# Patient Record
Sex: FEMALE | Race: WHITE | NOT HISPANIC OR LATINO
[De-identification: names, ages, dates, MRNs, and addresses within clinical notes are randomized per-mention and may not be internally consistent; named-entity substitution may affect disease eponyms.]

---

## 2017-01-20 ENCOUNTER — APPOINTMENT (OUTPATIENT)
Dept: PAIN MANAGEMENT | Facility: CLINIC | Age: 47
End: 2017-01-20

## 2017-01-20 VITALS
SYSTOLIC BLOOD PRESSURE: 156 MMHG | HEIGHT: 64 IN | HEART RATE: 77 BPM | WEIGHT: 234 LBS | DIASTOLIC BLOOD PRESSURE: 87 MMHG | BODY MASS INDEX: 39.95 KG/M2

## 2017-03-08 ENCOUNTER — MEDICATION RENEWAL (OUTPATIENT)
Age: 47
End: 2017-03-08

## 2017-03-13 ENCOUNTER — MEDICATION RENEWAL (OUTPATIENT)
Age: 47
End: 2017-03-13

## 2017-03-15 ENCOUNTER — APPOINTMENT (OUTPATIENT)
Dept: PAIN MANAGEMENT | Facility: CLINIC | Age: 47
End: 2017-03-15

## 2017-03-30 ENCOUNTER — APPOINTMENT (OUTPATIENT)
Dept: PAIN MANAGEMENT | Facility: CLINIC | Age: 47
End: 2017-03-30

## 2017-03-30 VITALS
BODY MASS INDEX: 40.97 KG/M2 | WEIGHT: 240 LBS | HEIGHT: 64 IN | DIASTOLIC BLOOD PRESSURE: 81 MMHG | SYSTOLIC BLOOD PRESSURE: 161 MMHG | HEART RATE: 81 BPM

## 2017-04-17 ENCOUNTER — APPOINTMENT (OUTPATIENT)
Dept: ORTHOPEDIC SURGERY | Facility: CLINIC | Age: 47
End: 2017-04-17

## 2017-05-15 ENCOUNTER — MEDICATION RENEWAL (OUTPATIENT)
Age: 47
End: 2017-05-15

## 2017-05-22 ENCOUNTER — APPOINTMENT (OUTPATIENT)
Dept: ORTHOPEDIC SURGERY | Facility: CLINIC | Age: 47
End: 2017-05-22

## 2017-05-26 ENCOUNTER — APPOINTMENT (OUTPATIENT)
Dept: PAIN MANAGEMENT | Facility: CLINIC | Age: 47
End: 2017-05-26

## 2017-06-13 ENCOUNTER — MEDICATION RENEWAL (OUTPATIENT)
Age: 47
End: 2017-06-13

## 2017-06-20 ENCOUNTER — APPOINTMENT (OUTPATIENT)
Dept: PAIN MANAGEMENT | Facility: CLINIC | Age: 47
End: 2017-06-20

## 2017-06-27 ENCOUNTER — APPOINTMENT (OUTPATIENT)
Dept: PAIN MANAGEMENT | Facility: CLINIC | Age: 47
End: 2017-06-27

## 2017-06-27 VITALS
SYSTOLIC BLOOD PRESSURE: 132 MMHG | BODY MASS INDEX: 40.97 KG/M2 | HEIGHT: 64 IN | DIASTOLIC BLOOD PRESSURE: 62 MMHG | WEIGHT: 240 LBS

## 2017-07-24 ENCOUNTER — MEDICATION RENEWAL (OUTPATIENT)
Age: 47
End: 2017-07-24

## 2017-08-21 ENCOUNTER — APPOINTMENT (OUTPATIENT)
Dept: PAIN MANAGEMENT | Facility: CLINIC | Age: 47
End: 2017-08-21
Payer: COMMERCIAL

## 2017-08-21 VITALS
BODY MASS INDEX: 40.97 KG/M2 | SYSTOLIC BLOOD PRESSURE: 158 MMHG | HEIGHT: 64 IN | WEIGHT: 240 LBS | HEART RATE: 93 BPM | DIASTOLIC BLOOD PRESSURE: 90 MMHG

## 2017-08-21 PROCEDURE — 99213 OFFICE O/P EST LOW 20 MIN: CPT

## 2017-09-01 ENCOUNTER — APPOINTMENT (OUTPATIENT)
Dept: ORTHOPEDIC SURGERY | Facility: CLINIC | Age: 47
End: 2017-09-01
Payer: COMMERCIAL

## 2017-09-01 VITALS
DIASTOLIC BLOOD PRESSURE: 84 MMHG | HEIGHT: 64 IN | BODY MASS INDEX: 40.97 KG/M2 | HEART RATE: 80 BPM | SYSTOLIC BLOOD PRESSURE: 132 MMHG | WEIGHT: 240 LBS

## 2017-09-01 PROCEDURE — 99214 OFFICE O/P EST MOD 30 MIN: CPT

## 2017-09-01 PROCEDURE — 73562 X-RAY EXAM OF KNEE 3: CPT

## 2017-09-01 PROCEDURE — 72170 X-RAY EXAM OF PELVIS: CPT

## 2017-09-26 ENCOUNTER — MEDICATION RENEWAL (OUTPATIENT)
Age: 47
End: 2017-09-26

## 2017-10-18 ENCOUNTER — APPOINTMENT (OUTPATIENT)
Dept: PAIN MANAGEMENT | Facility: CLINIC | Age: 47
End: 2017-10-18
Payer: COMMERCIAL

## 2017-10-18 VITALS
BODY MASS INDEX: 40.97 KG/M2 | SYSTOLIC BLOOD PRESSURE: 128 MMHG | WEIGHT: 240 LBS | HEIGHT: 64 IN | DIASTOLIC BLOOD PRESSURE: 78 MMHG

## 2017-10-18 PROCEDURE — 99213 OFFICE O/P EST LOW 20 MIN: CPT

## 2017-11-22 ENCOUNTER — MEDICATION RENEWAL (OUTPATIENT)
Age: 47
End: 2017-11-22

## 2017-12-19 ENCOUNTER — APPOINTMENT (OUTPATIENT)
Dept: PAIN MANAGEMENT | Facility: CLINIC | Age: 47
End: 2017-12-19
Payer: COMMERCIAL

## 2017-12-19 VITALS
HEART RATE: 77 BPM | WEIGHT: 240 LBS | SYSTOLIC BLOOD PRESSURE: 126 MMHG | HEIGHT: 64 IN | BODY MASS INDEX: 40.97 KG/M2 | DIASTOLIC BLOOD PRESSURE: 82 MMHG

## 2017-12-19 PROCEDURE — 99213 OFFICE O/P EST LOW 20 MIN: CPT

## 2017-12-21 ENCOUNTER — MEDICATION RENEWAL (OUTPATIENT)
Age: 47
End: 2017-12-21

## 2017-12-22 ENCOUNTER — APPOINTMENT (OUTPATIENT)
Dept: ORTHOPEDIC SURGERY | Facility: CLINIC | Age: 47
End: 2017-12-22
Payer: COMMERCIAL

## 2017-12-22 VITALS
HEART RATE: 69 BPM | WEIGHT: 240 LBS | SYSTOLIC BLOOD PRESSURE: 134 MMHG | DIASTOLIC BLOOD PRESSURE: 85 MMHG | BODY MASS INDEX: 40.97 KG/M2 | HEIGHT: 64 IN

## 2017-12-22 DIAGNOSIS — M17.12 UNILATERAL PRIMARY OSTEOARTHRITIS, LEFT KNEE: ICD-10-CM

## 2017-12-22 PROCEDURE — 99214 OFFICE O/P EST MOD 30 MIN: CPT

## 2018-01-05 ENCOUNTER — APPOINTMENT (OUTPATIENT)
Dept: ORTHOPEDIC SURGERY | Facility: CLINIC | Age: 48
End: 2018-01-05
Payer: COMMERCIAL

## 2018-01-05 VITALS
SYSTOLIC BLOOD PRESSURE: 157 MMHG | HEART RATE: 80 BPM | WEIGHT: 240 LBS | HEIGHT: 64 IN | BODY MASS INDEX: 40.97 KG/M2 | DIASTOLIC BLOOD PRESSURE: 82 MMHG | TEMPERATURE: 98.1 F

## 2018-01-05 DIAGNOSIS — M43.10 SPONDYLOLISTHESIS, SITE UNSPECIFIED: ICD-10-CM

## 2018-01-05 PROCEDURE — 72100 X-RAY EXAM L-S SPINE 2/3 VWS: CPT

## 2018-01-05 PROCEDURE — 99214 OFFICE O/P EST MOD 30 MIN: CPT

## 2018-01-25 ENCOUNTER — MEDICATION RENEWAL (OUTPATIENT)
Age: 48
End: 2018-01-25

## 2018-01-31 ENCOUNTER — OTHER (OUTPATIENT)
Age: 48
End: 2018-01-31

## 2018-01-31 ENCOUNTER — MEDICATION RENEWAL (OUTPATIENT)
Age: 48
End: 2018-01-31

## 2018-01-31 RX ORDER — RIZATRIPTAN BENZOATE 10 MG/1
10 TABLET ORAL
Qty: 18 | Refills: 5 | Status: DISCONTINUED | COMMUNITY
Start: 2017-10-18 | End: 2018-01-31

## 2018-02-07 ENCOUNTER — APPOINTMENT (OUTPATIENT)
Dept: PAIN MANAGEMENT | Facility: CLINIC | Age: 48
End: 2018-02-07
Payer: COMMERCIAL

## 2018-02-07 VITALS
HEIGHT: 64 IN | SYSTOLIC BLOOD PRESSURE: 157 MMHG | DIASTOLIC BLOOD PRESSURE: 87 MMHG | WEIGHT: 240 LBS | HEART RATE: 81 BPM | BODY MASS INDEX: 40.97 KG/M2

## 2018-02-07 PROCEDURE — 99213 OFFICE O/P EST LOW 20 MIN: CPT

## 2018-02-22 ENCOUNTER — MEDICATION RENEWAL (OUTPATIENT)
Age: 48
End: 2018-02-22

## 2018-03-26 ENCOUNTER — CHART COPY (OUTPATIENT)
Age: 48
End: 2018-03-26

## 2018-03-29 ENCOUNTER — MEDICATION RENEWAL (OUTPATIENT)
Age: 48
End: 2018-03-29

## 2018-04-03 ENCOUNTER — OTHER (OUTPATIENT)
Age: 48
End: 2018-04-03

## 2018-04-10 ENCOUNTER — APPOINTMENT (OUTPATIENT)
Dept: PAIN MANAGEMENT | Facility: CLINIC | Age: 48
End: 2018-04-10
Payer: COMMERCIAL

## 2018-04-10 VITALS
DIASTOLIC BLOOD PRESSURE: 84 MMHG | BODY MASS INDEX: 42.68 KG/M2 | SYSTOLIC BLOOD PRESSURE: 130 MMHG | HEIGHT: 64 IN | HEART RATE: 68 BPM | WEIGHT: 250 LBS

## 2018-04-10 PROCEDURE — 99213 OFFICE O/P EST LOW 20 MIN: CPT

## 2018-04-11 ENCOUNTER — APPOINTMENT (OUTPATIENT)
Dept: ORTHOPEDIC SURGERY | Facility: CLINIC | Age: 48
End: 2018-04-11
Payer: COMMERCIAL

## 2018-04-11 DIAGNOSIS — M23.304 OTHER MENISCUS DERANGEMENTS, UNSPECIFIED MEDIAL MENISCUS, LEFT KNEE: ICD-10-CM

## 2018-04-11 DIAGNOSIS — Q76.2 CONGENITAL SPONDYLOLISTHESIS: ICD-10-CM

## 2018-04-11 PROCEDURE — 99214 OFFICE O/P EST MOD 30 MIN: CPT

## 2018-05-03 ENCOUNTER — MEDICATION RENEWAL (OUTPATIENT)
Age: 48
End: 2018-05-03

## 2018-05-10 ENCOUNTER — CHART COPY (OUTPATIENT)
Age: 48
End: 2018-05-10

## 2018-06-01 ENCOUNTER — MEDICATION RENEWAL (OUTPATIENT)
Age: 48
End: 2018-06-01

## 2018-06-12 ENCOUNTER — MEDICATION RENEWAL (OUTPATIENT)
Age: 48
End: 2018-06-12

## 2018-06-12 ENCOUNTER — APPOINTMENT (OUTPATIENT)
Dept: PAIN MANAGEMENT | Facility: CLINIC | Age: 48
End: 2018-06-12
Payer: COMMERCIAL

## 2018-06-12 DIAGNOSIS — R51 HEADACHE: ICD-10-CM

## 2018-06-12 PROCEDURE — 99213 OFFICE O/P EST LOW 20 MIN: CPT

## 2018-07-09 ENCOUNTER — MEDICATION RENEWAL (OUTPATIENT)
Age: 48
End: 2018-07-09

## 2018-08-10 ENCOUNTER — MEDICATION RENEWAL (OUTPATIENT)
Age: 48
End: 2018-08-10

## 2018-08-14 ENCOUNTER — APPOINTMENT (OUTPATIENT)
Dept: PAIN MANAGEMENT | Facility: CLINIC | Age: 48
End: 2018-08-14
Payer: COMMERCIAL

## 2018-08-14 VITALS
BODY MASS INDEX: 42.68 KG/M2 | SYSTOLIC BLOOD PRESSURE: 150 MMHG | WEIGHT: 250 LBS | HEART RATE: 69 BPM | DIASTOLIC BLOOD PRESSURE: 77 MMHG | HEIGHT: 64 IN

## 2018-08-14 PROCEDURE — 99213 OFFICE O/P EST LOW 20 MIN: CPT

## 2018-08-23 ENCOUNTER — APPOINTMENT (OUTPATIENT)
Dept: SURGERY | Facility: CLINIC | Age: 48
End: 2018-08-23
Payer: COMMERCIAL

## 2018-08-23 VITALS
DIASTOLIC BLOOD PRESSURE: 94 MMHG | BODY MASS INDEX: 42.68 KG/M2 | TEMPERATURE: 98.1 F | HEIGHT: 64 IN | SYSTOLIC BLOOD PRESSURE: 141 MMHG | RESPIRATION RATE: 16 BRPM | WEIGHT: 250 LBS | OXYGEN SATURATION: 97 % | HEART RATE: 74 BPM

## 2018-08-23 PROCEDURE — 99243 OFF/OP CNSLTJ NEW/EST LOW 30: CPT

## 2018-08-23 RX ORDER — NABUMETONE 500 MG/1
500 TABLET, FILM COATED ORAL
Qty: 60 | Refills: 1 | Status: DISCONTINUED | COMMUNITY
Start: 2017-12-22 | End: 2018-08-23

## 2018-08-23 RX ORDER — DICLOFENAC SODIUM 75 MG/1
75 TABLET, DELAYED RELEASE ORAL
Qty: 60 | Refills: 0 | Status: DISCONTINUED | COMMUNITY
Start: 2017-09-01 | End: 2018-08-23

## 2018-09-06 ENCOUNTER — APPOINTMENT (OUTPATIENT)
Dept: SURGERY | Facility: CLINIC | Age: 48
End: 2018-09-06
Payer: COMMERCIAL

## 2018-09-06 VITALS
DIASTOLIC BLOOD PRESSURE: 84 MMHG | TEMPERATURE: 97.8 F | OXYGEN SATURATION: 96 % | HEART RATE: 73 BPM | SYSTOLIC BLOOD PRESSURE: 126 MMHG | RESPIRATION RATE: 15 BRPM

## 2018-09-06 PROCEDURE — 99214 OFFICE O/P EST MOD 30 MIN: CPT

## 2018-09-18 ENCOUNTER — MEDICATION RENEWAL (OUTPATIENT)
Age: 48
End: 2018-09-18

## 2018-10-04 ENCOUNTER — APPOINTMENT (OUTPATIENT)
Dept: SURGERY | Facility: CLINIC | Age: 48
End: 2018-10-04
Payer: COMMERCIAL

## 2018-10-08 ENCOUNTER — APPOINTMENT (OUTPATIENT)
Dept: PAIN MANAGEMENT | Facility: CLINIC | Age: 48
End: 2018-10-08

## 2018-10-24 ENCOUNTER — MEDICATION RENEWAL (OUTPATIENT)
Age: 48
End: 2018-10-24

## 2018-10-25 ENCOUNTER — APPOINTMENT (OUTPATIENT)
Dept: SURGERY | Facility: CLINIC | Age: 48
End: 2018-10-25
Payer: COMMERCIAL

## 2018-10-30 ENCOUNTER — APPOINTMENT (OUTPATIENT)
Dept: SURGERY | Facility: CLINIC | Age: 48
End: 2018-10-30
Payer: COMMERCIAL

## 2018-10-30 VITALS
OXYGEN SATURATION: 96 % | DIASTOLIC BLOOD PRESSURE: 85 MMHG | BODY MASS INDEX: 42.68 KG/M2 | HEIGHT: 64 IN | HEART RATE: 70 BPM | TEMPERATURE: 97.4 F | RESPIRATION RATE: 16 BRPM | WEIGHT: 250 LBS | SYSTOLIC BLOOD PRESSURE: 133 MMHG

## 2018-10-30 DIAGNOSIS — N64.89 OTHER SPECIFIED DISORDERS OF BREAST: ICD-10-CM

## 2018-10-30 PROCEDURE — 99214 OFFICE O/P EST MOD 30 MIN: CPT

## 2018-10-31 ENCOUNTER — APPOINTMENT (OUTPATIENT)
Dept: PAIN MANAGEMENT | Facility: CLINIC | Age: 48
End: 2018-10-31
Payer: COMMERCIAL

## 2018-10-31 VITALS
DIASTOLIC BLOOD PRESSURE: 84 MMHG | WEIGHT: 250 LBS | SYSTOLIC BLOOD PRESSURE: 136 MMHG | HEIGHT: 64 IN | HEART RATE: 75 BPM | BODY MASS INDEX: 42.68 KG/M2

## 2018-10-31 PROCEDURE — 99213 OFFICE O/P EST LOW 20 MIN: CPT

## 2018-11-30 ENCOUNTER — MEDICATION RENEWAL (OUTPATIENT)
Age: 48
End: 2018-11-30

## 2018-12-19 ENCOUNTER — APPOINTMENT (OUTPATIENT)
Dept: PAIN MANAGEMENT | Facility: CLINIC | Age: 48
End: 2018-12-19
Payer: COMMERCIAL

## 2018-12-19 VITALS
HEIGHT: 64 IN | SYSTOLIC BLOOD PRESSURE: 135 MMHG | HEART RATE: 65 BPM | BODY MASS INDEX: 43.54 KG/M2 | WEIGHT: 255 LBS | DIASTOLIC BLOOD PRESSURE: 72 MMHG

## 2018-12-19 PROCEDURE — 99213 OFFICE O/P EST LOW 20 MIN: CPT

## 2019-01-29 ENCOUNTER — MEDICATION RENEWAL (OUTPATIENT)
Age: 49
End: 2019-01-29

## 2019-02-14 ENCOUNTER — APPOINTMENT (OUTPATIENT)
Dept: PAIN MANAGEMENT | Facility: CLINIC | Age: 49
End: 2019-02-14
Payer: COMMERCIAL

## 2019-02-14 VITALS
DIASTOLIC BLOOD PRESSURE: 68 MMHG | BODY MASS INDEX: 39.27 KG/M2 | SYSTOLIC BLOOD PRESSURE: 107 MMHG | HEART RATE: 64 BPM | HEIGHT: 64 IN | WEIGHT: 230 LBS

## 2019-02-14 PROCEDURE — 99213 OFFICE O/P EST LOW 20 MIN: CPT

## 2019-02-14 NOTE — PHYSICAL EXAM
[General Appearance - Alert] : alert [General Appearance - Well Developed] : well developed [Oriented To Time, Place, And Person] : oriented to person, place, and time [Person] : oriented to person [Place] : oriented to place [Time] : oriented to time [Cranial Nerves Optic (II)] : visual acuity intact bilaterally,  visual fields full to confrontation, pupils equal round and reactive to light [Cranial Nerves Oculomotor (III)] : extraocular motion intact [Cranial Nerves Facial (VII)] : face symmetrical [Cranial Nerves Hypoglossal (XII)] : there was no tongue deviation with protrusion [Motor Strength] : muscle strength was normal in all four extremities [Sensation Tactile Decrease] : light touch was intact [Abnormal Walk] : normal gait [1+] : Ankle jerk left 1+ [Sclera] : the sclera and conjunctiva were normal [Outer Ear] : the ears and nose were normal in appearance [Neck Appearance] : the appearance of the neck was normal [Edema] : there was no peripheral edema [] : no rash [FreeTextEntry1] : lumbar paraspinal tenderness to palpation.

## 2019-02-14 NOTE — ASSESSMENT
[Opioids] : Patient was explained in detail about pain control by using opioids. Patient has signed and fully understands our guidelines for medication and drug screening.  Patient understands the side effects of opioids, including, but not limited to, drug tolerance, dependence, potential for addiction. This class of drugs is habit-forming and MOIRA regulated. The sedative effects of opioids can be potentiated by taking alcohol or any sleeping pills, along with opioids. The decision to drive is patient’s responsibility, as opioids can affect his/her driving ability and ability to concentrate. The long-term place is not clear, however, patient understands that once the pain control optimizes, the goal will be to wean off the opioids. All the issues regarding opioid treatment have been addressed satisfactorily.  [FreeTextEntry1] : Chronic pain syndrome\par Lumbago\par Stable but active. \par \par No signs of toxicity.  Will continue to monitor.  Her medications were renewed without changes. ISTOP checked.  The risks of long term opioid use reviewed.  \par \par Her opiate agreement was reviewed and we discussed locked box for her medications.  She denies side effects to medications-bm regular.\par \par She will RTO in two-three months time or sooner if clinically indicated.  She will continue follow up appts.with Dr. Howard (ortho).\par \par Dr. Calhoun on site.  Billed incident to the service.

## 2019-02-14 NOTE — REASON FOR VISIT
[Follow-Up: _____] : a [unfilled] follow-up visit [FreeTextEntry1] : chronic lower back pain-S/p lumbar fusion 4/22/2013 (Dr. Smyth).

## 2019-02-14 NOTE — HISTORY OF PRESENT ILLNESS
[FreeTextEntry1] : The patient returned today for a follow up ov.  She was last seen 12/19/18.  She continues to be stable on her current medication regimen, reporting both decrease in pain and increase in functioning. VAS= 5/10=with medications.   \par \par Her migraines have improved frequency and intensity.  BL=957/68.  \par \par She is unable to work due to her severe pain.  However, she is able to function with the medications at home. \par \par She denies depression and suicidal ideations upon full questioning. \par No new medical complaints at this time. \par \par

## 2019-02-26 ENCOUNTER — RX RENEWAL (OUTPATIENT)
Age: 49
End: 2019-02-26

## 2019-02-26 RX ORDER — SUMATRIPTAN SUCCINATE 6 MG/.5ML
6 INJECTION SUBCUTANEOUS
Qty: 5 | Refills: 1 | Status: ACTIVE | COMMUNITY
Start: 2018-01-31 | End: 1900-01-01

## 2019-02-28 ENCOUNTER — TRANSCRIPTION ENCOUNTER (OUTPATIENT)
Age: 49
End: 2019-02-28

## 2019-03-06 ENCOUNTER — MEDICATION RENEWAL (OUTPATIENT)
Age: 49
End: 2019-03-06

## 2019-03-27 ENCOUNTER — MEDICATION RENEWAL (OUTPATIENT)
Age: 49
End: 2019-03-27

## 2019-04-22 ENCOUNTER — APPOINTMENT (OUTPATIENT)
Dept: PAIN MANAGEMENT | Facility: CLINIC | Age: 49
End: 2019-04-22
Payer: COMMERCIAL

## 2019-04-22 VITALS
SYSTOLIC BLOOD PRESSURE: 115 MMHG | HEIGHT: 64 IN | DIASTOLIC BLOOD PRESSURE: 76 MMHG | WEIGHT: 230 LBS | BODY MASS INDEX: 39.27 KG/M2 | HEART RATE: 69 BPM

## 2019-04-22 PROCEDURE — 99213 OFFICE O/P EST LOW 20 MIN: CPT

## 2019-04-22 NOTE — PHYSICAL EXAM
[General Appearance - Alert] : alert [Oriented To Time, Place, And Person] : oriented to person, place, and time [General Appearance - Well Developed] : well developed [Person] : oriented to person [Time] : oriented to time [Place] : oriented to place [Cranial Nerves Optic (II)] : visual acuity intact bilaterally,  visual fields full to confrontation, pupils equal round and reactive to light [Cranial Nerves Facial (VII)] : face symmetrical [Cranial Nerves Oculomotor (III)] : extraocular motion intact [Cranial Nerves Hypoglossal (XII)] : there was no tongue deviation with protrusion [Sensation Tactile Decrease] : light touch was intact [Motor Strength] : muscle strength was normal in all four extremities [Abnormal Walk] : normal gait [1+] : Ankle jerk left 1+ [Sclera] : the sclera and conjunctiva were normal [Neck Appearance] : the appearance of the neck was normal [Outer Ear] : the ears and nose were normal in appearance [Edema] : there was no peripheral edema [] : no rash [FreeTextEntry1] : lumbar paraspinal tenderness to palpation.

## 2019-04-22 NOTE — ASSESSMENT
[FreeTextEntry1] : No signs of toxicity.  Will continue to monitor.  Her medications were renewed.  In addition she was prescribed flexeril 10 mgs 1/2 -1 tab po bid prn due to her complaints of waking up in pain at night.  She was instructed not to drive due to her medications.  ISTOP checked (#[id number]).  The risks of long term opioid use reviewed with the patient again today.  \par \par Her opiate agreement was reviewed and we reviewed locked box for her medications.  She denies side effects to medications-bm regular.\par \par She will RTO in two-three months time or sooner if clinically indicated.  She will continue follow up appts.with Dr. Howard (ortho).\par \par Dr. Franks on site.  Billed incident to the service.  [Opioids] : Patient was explained in detail about pain control by using opioids. Patient has signed and fully understands our guidelines for medication and drug screening.  Patient understands the side effects of opioids, including, but not limited to, drug tolerance, dependence, potential for addiction. This class of drugs is habit-forming and MOIRA regulated. The sedative effects of opioids can be potentiated by taking alcohol or any sleeping pills, along with opioids. The decision to drive is patient’s responsibility, as opioids can affect his/her driving ability and ability to concentrate. The long-term place is not clear, however, patient understands that once the pain control optimizes, the goal will be to wean off the opioids. All the issues regarding opioid treatment have been addressed satisfactorily.

## 2019-04-22 NOTE — HISTORY OF PRESENT ILLNESS
[FreeTextEntry1] : The patient returned today for a follow up ov for her chronic lower back pain.  She was last seen 2/14/19.  For the most part she has been stable and doing well with her medications.  She reports both decrease in pain and increase in functioning. VAS= 5-6/10=with medications.   She does note however that she has been waking up at night, in pain for approximately the past 3-4 weeks. \par \par Her migraines have improved, both frequency and intensity.  LW=876/76.  \par \par She continues to be unable to work due to her severe pain.  However, she is able to function with the medications at home. \par \par She denies depression and suicidal ideations upon full questioning. \par \par No other medical complaints.\par

## 2019-06-18 ENCOUNTER — APPOINTMENT (OUTPATIENT)
Dept: PAIN MANAGEMENT | Facility: CLINIC | Age: 49
End: 2019-06-18
Payer: MEDICARE

## 2019-06-18 VITALS — WEIGHT: 230 LBS | HEIGHT: 64 IN | BODY MASS INDEX: 39.27 KG/M2

## 2019-06-18 PROCEDURE — 99213 OFFICE O/P EST LOW 20 MIN: CPT

## 2019-06-18 NOTE — PHYSICAL EXAM
[General Appearance - Alert] : alert [General Appearance - Well Developed] : well developed [Oriented To Time, Place, And Person] : oriented to person, place, and time [Person] : oriented to person [Place] : oriented to place [Time] : oriented to time [Cranial Nerves Oculomotor (III)] : extraocular motion intact [Cranial Nerves Facial (VII)] : face symmetrical [Cranial Nerves Optic (II)] : visual acuity intact bilaterally,  visual fields full to confrontation, pupils equal round and reactive to light [Cranial Nerves Hypoglossal (XII)] : there was no tongue deviation with protrusion [Motor Strength] : muscle strength was normal in all four extremities [Sensation Tactile Decrease] : light touch was intact [Abnormal Walk] : normal gait [1+] : Ankle jerk left 1+ [Sclera] : the sclera and conjunctiva were normal [Outer Ear] : the ears and nose were normal in appearance [Neck Appearance] : the appearance of the neck was normal [Edema] : there was no peripheral edema [FreeTextEntry1] : lumbar paraspinal tenderness to palpation.  [] : no rash

## 2019-06-18 NOTE — ASSESSMENT
[FreeTextEntry1] : Istop checked.  No signs of toxicity.  Will continue to monitor.  Her medications were renewed previously.  She does not need any refills today.  The risks of long term opioid use reviewed with the patient again today, as well as other treatment options. \par \par She will proceed with her pcp appt tomorrow for her poison ivy.  \par \par No aberrant behavior noted.  She appears to be using her medications both reasonably and responsibly.  Her opiate agreement was reviewed and we reviewed locked box for her medications.  She denies side effects to medications-bm regular.\par \par She will RTO in two-three months time or sooner if clinically indicated.  She will continue follow up appts.with Dr. Howard (ortho) as needed.\par \par Dr. Franks on site.  Billed incident to the service.  [Opioids] : Patient was explained in detail about pain control by using opioids. Patient has signed and fully understands our guidelines for medication and drug screening.  Patient understands the side effects of opioids, including, but not limited to, drug tolerance, dependence, potential for addiction. This class of drugs is habit-forming and MOIRA regulated. The sedative effects of opioids can be potentiated by taking alcohol or any sleeping pills, along with opioids. The decision to drive is patient’s responsibility, as opioids can affect his/her driving ability and ability to concentrate. The long-term place is not clear, however, patient understands that once the pain control optimizes, the goal will be to wean off the opioids. All the issues regarding opioid treatment have been addressed satisfactorily.

## 2019-08-14 ENCOUNTER — MEDICATION RENEWAL (OUTPATIENT)
Age: 49
End: 2019-08-14

## 2019-09-09 ENCOUNTER — TRANSCRIPTION ENCOUNTER (OUTPATIENT)
Age: 49
End: 2019-09-09

## 2019-09-10 ENCOUNTER — APPOINTMENT (OUTPATIENT)
Dept: SURGERY | Facility: CLINIC | Age: 49
End: 2019-09-10
Payer: COMMERCIAL

## 2019-09-10 VITALS
OXYGEN SATURATION: 99 % | DIASTOLIC BLOOD PRESSURE: 88 MMHG | HEART RATE: 75 BPM | TEMPERATURE: 97.7 F | RESPIRATION RATE: 15 BRPM | SYSTOLIC BLOOD PRESSURE: 140 MMHG

## 2019-09-10 DIAGNOSIS — Z12.39 ENCOUNTER FOR OTHER SCREENING FOR MALIGNANT NEOPLASM OF BREAST: ICD-10-CM

## 2019-09-10 DIAGNOSIS — Z00.00 ENCOUNTER FOR GENERAL ADULT MEDICAL EXAMINATION W/OUT ABNORMAL FINDINGS: ICD-10-CM

## 2019-09-10 PROCEDURE — 99214 OFFICE O/P EST MOD 30 MIN: CPT

## 2019-09-10 NOTE — ASSESSMENT
[FreeTextEntry1] : I again reiterated to the patient about breast self-examination. I have given her a prescription for a mammogram and sonogram and asked her to make sure there the reports are sent to me. She is to call me 3 days after she has a mammogram and sonogram report.

## 2019-09-10 NOTE — HISTORY OF PRESENT ILLNESS
[de-identified] : Shu is a 48 y/o female here for evaluation of left breast pain. Last seen on 10/30/18. Pt states she feels 2 pink 5mm skin lesions on the left breast. Pt states she feeling burning sensation. First noticed one lump 2 weeks ago and other lump today. The patient states that she occasionally does breast self examination. Her last mammogram and sonogram was approximately one year ago

## 2019-09-10 NOTE — PHYSICAL EXAM
[de-identified] : Examination of both breasts in the sitting and supine position fails to reveal any dominant masses. There is no nipple discharge or change in the contour of the nipples. Both axilla are negative for any adenopathy.

## 2019-09-18 ENCOUNTER — MEDICATION RENEWAL (OUTPATIENT)
Age: 49
End: 2019-09-18

## 2019-10-16 ENCOUNTER — APPOINTMENT (OUTPATIENT)
Dept: PAIN MANAGEMENT | Facility: CLINIC | Age: 49
End: 2019-10-16
Payer: COMMERCIAL

## 2019-10-16 VITALS
HEIGHT: 64 IN | SYSTOLIC BLOOD PRESSURE: 134 MMHG | BODY MASS INDEX: 41.83 KG/M2 | HEART RATE: 72 BPM | WEIGHT: 245 LBS | DIASTOLIC BLOOD PRESSURE: 70 MMHG

## 2019-10-16 PROCEDURE — 99213 OFFICE O/P EST LOW 20 MIN: CPT

## 2019-10-16 NOTE — PHYSICAL EXAM
[General Appearance - Alert] : alert [General Appearance - Well Developed] : well developed [Oriented To Time, Place, And Person] : oriented to person, place, and time [Person] : oriented to person [Place] : oriented to place [Time] : oriented to time [Cranial Nerves Optic (II)] : visual acuity intact bilaterally,  visual fields full to confrontation, pupils equal round and reactive to light [Cranial Nerves Oculomotor (III)] : extraocular motion intact [Cranial Nerves Facial (VII)] : face symmetrical [Cranial Nerves Hypoglossal (XII)] : there was no tongue deviation with protrusion [Motor Strength] : muscle strength was normal in all four extremities [Sensation Tactile Decrease] : light touch was intact [Abnormal Walk] : normal gait [1+] : Ankle jerk left 1+ [Sclera] : the sclera and conjunctiva were normal [Outer Ear] : the ears and nose were normal in appearance [Neck Appearance] : the appearance of the neck was normal [Edema] : there was no peripheral edema [FreeTextEntry1] : lumbar paraspinal tenderness to palpation.  [] : no rash

## 2019-10-16 NOTE — HISTORY OF PRESENT ILLNESS
[FreeTextEntry1] : Mrs. Martinez returned today for a follow up office visit for her chronic lower back pain.  She states that she continues to have good pain relief with her current medication regimen.  She reports both decrease pain and increase functioning.  DEANNA= 4-5/10.  She continues to be unable to work due to her severe pa; however, she is able to function with the medications at home- home exercise program and ADL's.  \par \par Her migraines have improved, both frequency and intensity.  \par \par No complaints of depression/anxiety/suicidal ideations. \par \par No other medical complaints.\par

## 2019-10-16 NOTE — ASSESSMENT
[FreeTextEntry1] :  registry reviewed.  No signs of toxicity.  Will continue to monitor.  Her medications were renewed without changes for now. The risks of long term opioid use reviewed with the patient again today. \par \par No aberrant behavior noted.  She appears to be using her medications both reasonably and responsibly.  Her opiate agreement was reviewed and we reviewed locked box for her medications.  She denies side effects to medications.\par \par She will RTO in two-three months time or sooner if clinically indicated.  She will continue follow up appts.with Dr. Howard (ortho) as needed.\par \par Dr. Franks on site.  Billed incident to the service.  [Opioids] : Patient was explained in detail about pain control by using opioids. Patient has signed and fully understands our guidelines for medication and drug screening.  Patient understands the side effects of opioids, including, but not limited to, drug tolerance, dependence, potential for addiction. This class of drugs is habit-forming and MOIRA regulated. The sedative effects of opioids can be potentiated by taking alcohol or any sleeping pills, along with opioids. The decision to drive is patient’s responsibility, as opioids can affect his/her driving ability and ability to concentrate. The long-term place is not clear, however, patient understands that once the pain control optimizes, the goal will be to wean off the opioids. All the issues regarding opioid treatment have been addressed satisfactorily.

## 2019-11-18 ENCOUNTER — MEDICATION RENEWAL (OUTPATIENT)
Age: 49
End: 2019-11-18

## 2019-12-18 ENCOUNTER — APPOINTMENT (OUTPATIENT)
Dept: PAIN MANAGEMENT | Facility: CLINIC | Age: 49
End: 2019-12-18
Payer: COMMERCIAL

## 2019-12-18 VITALS
HEIGHT: 64 IN | SYSTOLIC BLOOD PRESSURE: 134 MMHG | WEIGHT: 245 LBS | DIASTOLIC BLOOD PRESSURE: 84 MMHG | BODY MASS INDEX: 41.83 KG/M2 | HEART RATE: 81 BPM

## 2019-12-18 DIAGNOSIS — M51.36 OTHER INTERVERTEBRAL DISC DEGENERATION, LUMBAR REGION: ICD-10-CM

## 2019-12-18 PROCEDURE — 99213 OFFICE O/P EST LOW 20 MIN: CPT

## 2019-12-19 NOTE — ASSESSMENT
[FreeTextEntry1] :  registry reviewed.  No signs of toxicity.  Will continue to monitor.  Her medications were renewed with the exception that the embedda was changed to morphine er 45 mgs bid. The risks of long term opioid use reviewed.  She continues to feel that the benefits outweigh the risks.   \par \par No aberrant behavior noted.  She appears to be using her medications both reasonably and responsibly.  Her opiate agreement was reviewed and we reviewed locked box for her medications.  She denies side effects to medications.\par \par She will RTO in three months time or sooner if clinically indicated.  She will continue follow up appts.with Dr. Howard (ortho) as needed.\par \par Dr. Franks on site.  Billed incident to the service.  [Opioids] : Patient was explained in detail about pain control by using opioids. Patient has signed and fully understands our guidelines for medication and drug screening.  Patient understands the side effects of opioids, including, but not limited to, drug tolerance, dependence, potential for addiction. This class of drugs is habit-forming and MOIRA regulated. The sedative effects of opioids can be potentiated by taking alcohol or any sleeping pills, along with opioids. The decision to drive is patient’s responsibility, as opioids can affect his/her driving ability and ability to concentrate. The long-term place is not clear, however, patient understands that once the pain control optimizes, the goal will be to wean off the opioids. All the issues regarding opioid treatment have been addressed satisfactorily.

## 2019-12-19 NOTE — PHYSICAL EXAM
[General Appearance - Well Developed] : well developed [General Appearance - Alert] : alert [Oriented To Time, Place, And Person] : oriented to person, place, and time [Person] : oriented to person [Place] : oriented to place [Time] : oriented to time [Cranial Nerves Optic (II)] : visual acuity intact bilaterally,  visual fields full to confrontation, pupils equal round and reactive to light [Cranial Nerves Oculomotor (III)] : extraocular motion intact [Cranial Nerves Facial (VII)] : face symmetrical [Cranial Nerves Hypoglossal (XII)] : there was no tongue deviation with protrusion [Motor Strength] : muscle strength was normal in all four extremities [Sensation Tactile Decrease] : light touch was intact [Abnormal Walk] : normal gait [1+] : Ankle jerk left 1+ [Sclera] : the sclera and conjunctiva were normal [Outer Ear] : the ears and nose were normal in appearance [Neck Appearance] : the appearance of the neck was normal [Edema] : there was no peripheral edema [] : no rash [FreeTextEntry1] : lumbar paraspinal tenderness to palpation.

## 2019-12-19 NOTE — HISTORY OF PRESENT ILLNESS
[FreeTextEntry1] : Shu returned today for a follow up appointment.  She continues under our care for treatment of her chronic lower back pain.  She states that she continues to experience relief with the embeda; however, it has been pulled off the market.  She is requesting an alternative.  DEANNA= 4/10.  She continues to be unable to work due to her pain; however, she is able to function with the medications at home with light household chores and self care.   \par \par Her migraines have improved significantly.    \par \par No complaints of depression/anxiety/suicidal ideations. \par \par No other medical complaints.\par

## 2020-02-12 ENCOUNTER — APPOINTMENT (OUTPATIENT)
Dept: PAIN MANAGEMENT | Facility: CLINIC | Age: 50
End: 2020-02-12
Payer: COMMERCIAL

## 2020-02-12 DIAGNOSIS — M54.16 RADICULOPATHY, LUMBAR REGION: ICD-10-CM

## 2020-02-12 PROCEDURE — 99213 OFFICE O/P EST LOW 20 MIN: CPT

## 2020-02-19 NOTE — ASSESSMENT
[FreeTextEntry1] :  registry reviewed.  No signs of toxicity.  Will continue to monitor.  Her medications were renewed without changes for now. The risks of long term opioid use reviewed.    \par \par No aberrant behavior noted.  She appears to be using her medications both reasonably and responsibly.  Her opiate agreement was reviewed and we reviewed locked box for her medications.  She denies side effects to medications.\par \par She will RTO in three months time or sooner if clinically indicated.  \par \par Dr. Franks on site.  Billed incident to the service.  [Opioids] : Patient was explained in detail about pain control by using opioids. Patient has signed and fully understands our guidelines for medication and drug screening.  Patient understands the side effects of opioids, including, but not limited to, drug tolerance, dependence, potential for addiction. This class of drugs is habit-forming and MOIRA regulated. The sedative effects of opioids can be potentiated by taking alcohol or any sleeping pills, along with opioids. The decision to drive is patient’s responsibility, as opioids can affect his/her driving ability and ability to concentrate. The long-term place is not clear, however, patient understands that once the pain control optimizes, the goal will be to wean off the opioids. All the issues regarding opioid treatment have been addressed satisfactorily.

## 2020-02-19 NOTE — PHYSICAL EXAM
[General Appearance - Alert] : alert [General Appearance - Well Developed] : well developed [Oriented To Time, Place, And Person] : oriented to person, place, and time [Place] : oriented to place [Cranial Nerves Optic (II)] : visual acuity intact bilaterally,  visual fields full to confrontation, pupils equal round and reactive to light [Time] : oriented to time [Person] : oriented to person [Cranial Nerves Facial (VII)] : face symmetrical [Cranial Nerves Hypoglossal (XII)] : there was no tongue deviation with protrusion [Cranial Nerves Oculomotor (III)] : extraocular motion intact [Abnormal Walk] : normal gait [Sensation Tactile Decrease] : light touch was intact [Motor Strength] : muscle strength was normal in all four extremities [1+] : Ankle jerk left 1+ [Sclera] : the sclera and conjunctiva were normal [Outer Ear] : the ears and nose were normal in appearance [Neck Appearance] : the appearance of the neck was normal [FreeTextEntry1] : lumbar paraspinal tenderness to palpation.  [] : no rash [Edema] : there was no peripheral edema

## 2020-02-19 NOTE — HISTORY OF PRESENT ILLNESS
[FreeTextEntry1] : Shu returned today for a follow up office visit.  She is under our care for treatment of her chronic lower back pain. She states that she does notice quite a difference since being switch from embedda to morpine ER after embeda was pulled off the market. DEANNA= 6-7/10 (increase in her pain since change).\par \par She continues to be unable to work due to her pain; however, she is generally able to function with the medications at home with light household chores and self care.   \par \par Her migraines have improved significantly.    \par \par No complaints of depression/anxiety/suicidal ideations. \par \par No other medical complaints.\par

## 2020-04-07 ENCOUNTER — APPOINTMENT (OUTPATIENT)
Dept: PAIN MANAGEMENT | Facility: CLINIC | Age: 50
End: 2020-04-07
Payer: COMMERCIAL

## 2020-04-07 DIAGNOSIS — M48.061 SPINAL STENOSIS, LUMBAR REGION WITHOUT NEUROGENIC CLAUDICATION: ICD-10-CM

## 2020-04-07 DIAGNOSIS — M51.26 OTHER INTERVERTEBRAL DISC DISPLACEMENT, LUMBAR REGION: ICD-10-CM

## 2020-04-07 PROCEDURE — 99213 OFFICE O/P EST LOW 20 MIN: CPT

## 2020-04-13 NOTE — HISTORY OF PRESENT ILLNESS
[FreeTextEntry1] : Medical Purpose: Chronic Opioid Therapy Management\par \par In office visit. \par \par Shu returned today for a follow up office visit for treatment of her chronic pain and renewal of medications. Reports good pain relief, both decrease pain and increase functioning.  DEANNA= 5-6/10.  No reported side effects.  Last UDS was reviewed dated.  No other medical complaints.  Denies depression or anxiety.\par \par

## 2020-04-13 NOTE — ASSESSMENT
[Opioids] : Patient was explained in detail about pain control by using opioids. Patient has signed and fully understands our guidelines for medication and drug screening.  Patient understands the side effects of opioids, including, but not limited to, drug tolerance, dependence, potential for addiction. This class of drugs is habit-forming and MOIRA regulated. The sedative effects of opioids can be potentiated by taking alcohol or any sleeping pills, along with opioids. The decision to drive is patient’s responsibility, as opioids can affect his/her driving ability and ability to concentrate. The long-term place is not clear, however, patient understands that once the pain control optimizes, the goal will be to wean off the opioids. All the issues regarding opioid treatment have been addressed satisfactorily.  [FreeTextEntry1] : Shu's medications were renewed.  Istop checked.  No side effects reported.  The risks of long term opioids were reviewed.  Addiction, tolerance, and dependancy was also discussed.\par \par HEP encouraged.\par \par UDS sent today.  Opioid consent updated/resigned.\par \par The patient will RTO in one month's time or sooner if clinically indicated.  The patient is aware that I will no longer be with the practice as of 4/30/20 and no longer able to provide care.  Next follow up appointment may be scheduled with Shelbi Alvarado NP.  The plan will be to continue tapering down MEDD.\par

## 2020-04-13 NOTE — REASON FOR VISIT
[Follow-Up: _____] : a [unfilled] follow-up visit [FreeTextEntry1] : Chronic lower back pain; s/p lumbar fusion (4/22/13 by Dr. Smyth)

## 2020-04-21 RX ORDER — MORPHINE SULFATE 15 MG/1
15 TABLET, FILM COATED, EXTENDED RELEASE ORAL
Qty: 60 | Refills: 0 | Status: DISCONTINUED | COMMUNITY
Start: 2019-12-18 | End: 2020-04-21

## 2020-04-21 RX ORDER — MORPHINE SULFATE 30 MG/1
30 TABLET, FILM COATED, EXTENDED RELEASE ORAL
Qty: 60 | Refills: 0 | Status: DISCONTINUED | COMMUNITY
Start: 2019-12-18 | End: 2020-04-21

## 2020-04-21 RX ORDER — OXYCODONE 18 MG/1
18 CAPSULE, EXTENDED RELEASE ORAL
Qty: 60 | Refills: 0 | Status: DISCONTINUED | COMMUNITY
Start: 2020-04-21 | End: 2020-04-21

## 2020-04-21 RX ORDER — OXYCODONE HYDROCHLORIDE 20 MG/1
20 TABLET, FILM COATED, EXTENDED RELEASE ORAL
Qty: 60 | Refills: 0 | Status: DISCONTINUED | COMMUNITY
Start: 2020-04-21 | End: 2020-04-21

## 2020-06-25 ENCOUNTER — APPOINTMENT (OUTPATIENT)
Dept: PAIN MANAGEMENT | Facility: CLINIC | Age: 50
End: 2020-06-25
Payer: COMMERCIAL

## 2020-06-25 PROCEDURE — 99214 OFFICE O/P EST MOD 30 MIN: CPT | Mod: 95

## 2020-06-25 NOTE — PHYSICAL EXAM
[General Appearance - Alert] : alert [Affect] : the affect was normal [FreeTextEntry1] : No signs of toxicity [Person] : oriented to person [Place] : oriented to place [Time] : oriented to time [Outer Ear] : the ears and nose were normal in appearance [Sclera] : the sclera and conjunctiva were normal [Neck Appearance] : the appearance of the neck was normal [Musculoskeletal - Swelling] : no joint swelling seen [] : no rash

## 2020-06-25 NOTE — DISCUSSION/SUMMARY
[FreeTextEntry1] : Chronic musculoskeletal exam\par \par Patient agrees to taper her daily opioid dose. In place will rec Lyrica 25 mgs tid. Advised of AEs.\par Continue to monitor for signs of toxicity/\par Advise re driving

## 2020-06-25 NOTE — HISTORY OF PRESENT ILLNESS
[Home] : at home, [unfilled] , at the time of the visit. [Medical Office: ([facility])___] : at the medical office located in  [Verbal consent obtained from patient] : the patient, [unfilled] [FreeTextEntry1] : I am taking over the pain care from [facility]. \par \par 49 y/o female mostly in lower back and bilateral feet pain; sp bone fusion of lumbar spine.\par Review of meds = MS ER 45 am and 30 mgs qhs; Oxycodone 10 mgs tid\par \par Function: patient able to do min ADLs w medication

## 2020-07-21 ENCOUNTER — APPOINTMENT (OUTPATIENT)
Dept: PAIN MANAGEMENT | Facility: CLINIC | Age: 50
End: 2020-07-21
Payer: COMMERCIAL

## 2020-07-21 VITALS
DIASTOLIC BLOOD PRESSURE: 80 MMHG | TEMPERATURE: 97.2 F | HEIGHT: 64 IN | SYSTOLIC BLOOD PRESSURE: 144 MMHG | WEIGHT: 250 LBS | BODY MASS INDEX: 42.68 KG/M2 | HEART RATE: 77 BPM

## 2020-07-21 VITALS — TEMPERATURE: 97.6 F

## 2020-07-21 PROCEDURE — 99214 OFFICE O/P EST MOD 30 MIN: CPT

## 2020-07-22 NOTE — ASSESSMENT
[Opioids] : Patient was explained in detail about pain control by using opioids. Patient has signed and fully understands our guidelines for medication and drug screening.  Patient understands the side effects of opioids, including, but not limited to, drug tolerance, dependence, potential for addiction. This class of drugs is habit-forming and [employer] regulated. The sedative effects of opioids can be potentiated by taking alcohol or any sleeping pills, along with opioids. The decision to drive is patient’s responsibility, as opioids can affect his/her driving ability and ability to concentrate. The long-term place is not clear, however, patient understands that once the pain control optimizes, the goal will be to wean off the opioids. All the issues regarding opioid treatment have been addressed satisfactorily.  [FreeTextEntry1] : Chronic pain syndrome\par Low back pain\par Migraine\par \par Will continue to taper MSER 15 and 30 mgs qd\par Continue Oxycodone\par Will switch to Nurtec form Sumatriptan\par Add Lyrica 25 mg one in am and 2 pm/ Advised of AEs.\par Continue to monitor for signs of toxicity\par FU LL

## 2020-07-22 NOTE — HISTORY OF PRESENT ILLNESS
[FreeTextEntry1] : \par \par 49 y/o female mostly in lower back and bilateral feet pain; sp bone fusion of lumbar spine.\par Review of meds = MS ER 30 am and 30 mgs qhs; Oxycodone 10 mgs tid. Tapering of opioid has noit significantly impacted her pain and function\par Patient reports pain is worse at night\par Function: patient able to do min ADLs w medication

## 2020-07-22 NOTE — PHYSICAL EXAM
[General Appearance - Alert] : alert [Affect] : the affect was normal [Person] : oriented to person [Place] : oriented to place [Time] : oriented to time [Sclera] : the sclera and conjunctiva were normal [Outer Ear] : the ears and nose were normal in appearance [Neck Appearance] : the appearance of the neck was normal [Musculoskeletal - Swelling] : no joint swelling seen [] : no rash [FreeTextEntry1] : No signs of toxicity

## 2020-08-26 ENCOUNTER — APPOINTMENT (OUTPATIENT)
Dept: PAIN MANAGEMENT | Facility: CLINIC | Age: 50
End: 2020-08-26

## 2020-09-15 ENCOUNTER — APPOINTMENT (OUTPATIENT)
Dept: PAIN MANAGEMENT | Facility: CLINIC | Age: 50
End: 2020-09-15
Payer: COMMERCIAL

## 2020-09-15 VITALS
DIASTOLIC BLOOD PRESSURE: 83 MMHG | HEIGHT: 64 IN | HEART RATE: 80 BPM | BODY MASS INDEX: 43.54 KG/M2 | SYSTOLIC BLOOD PRESSURE: 137 MMHG | WEIGHT: 255 LBS

## 2020-09-15 VITALS — TEMPERATURE: 97.5 F

## 2020-09-15 PROCEDURE — 99213 OFFICE O/P EST LOW 20 MIN: CPT

## 2020-09-15 NOTE — PHYSICAL EXAM
[Oriented To Time, Place, And Person] : oriented to person, place, and time [Affect] : the affect was normal [Mood] : the mood was normal [Sclera] : the sclera and conjunctiva were normal [] : no respiratory distress [Respiration, Rhythm And Depth] : normal respiratory rhythm and effort

## 2020-09-15 NOTE — REVIEW OF SYSTEMS
[Fever] : no fever [Chills] : no chills [Chest Pain] : no chest pain [Palpitations] : no palpitations [Shortness Of Breath] : no shortness of breath

## 2020-09-15 NOTE — ASSESSMENT
[Opioids] : Patient was explained in detail about pain control by using opioids. Patient has signed and fully understands our guidelines for medication and drug screening.  Patient understands the side effects of opioids, including, but not limited to, drug tolerance, dependence, potential for addiction. This class of drugs is habit-forming and [employer] regulated. The sedative effects of opioids can be potentiated by taking alcohol or any sleeping pills, along with opioids. The decision to drive is patient’s responsibility, as opioids can affect his/her driving ability and ability to concentrate. The long-term place is not clear, however, patient understands that once the pain control optimizes, the goal will be to wean off the opioids. All the issues regarding opioid treatment have been addressed satisfactorily.  [FreeTextEntry1] : ISTOP [id number]\par Will increase lyrica 50mg TID. \par RTO 1 month \par \par Dr Calhoun on site , billed incident to service.

## 2020-09-15 NOTE — HISTORY OF PRESENT ILLNESS
[FreeTextEntry1] : Pt returns today for a follow up visit . \par Continues to have lower backpain .\par Reports this month was much more difficulty in terms of pain .\par . Had MRI lumbar spine done - results pending. \par Pt is tolerating Lyrica 25mg TID well, we discussed increasing to 50mg TID - pt cautioned regarding driving. \par No signs of toxicity noted .\par Pt reminded to avoid alcohol .\par \par Nurtec was helpful for migraine relief. \par

## 2020-10-20 ENCOUNTER — APPOINTMENT (OUTPATIENT)
Dept: PAIN MANAGEMENT | Facility: CLINIC | Age: 50
End: 2020-10-20
Payer: COMMERCIAL

## 2020-10-20 VITALS
HEIGHT: 64 IN | SYSTOLIC BLOOD PRESSURE: 147 MMHG | HEART RATE: 75 BPM | WEIGHT: 255 LBS | DIASTOLIC BLOOD PRESSURE: 86 MMHG | BODY MASS INDEX: 43.54 KG/M2

## 2020-10-20 VITALS — TEMPERATURE: 97.8 F

## 2020-10-20 PROCEDURE — 99213 OFFICE O/P EST LOW 20 MIN: CPT

## 2020-10-20 NOTE — ASSESSMENT
[FreeTextEntry1] : Refill are not needed today . Pt will call for refills \par NO signs of toxicity noted . \par \par Dr Franks on site , billed incident to service. \par  [Opioids] : Patient was explained in detail about pain control by using opioids. Patient has signed and fully understands our guidelines for medication and drug screening.  Patient understands the side effects of opioids, including, but not limited to, drug tolerance, dependence, potential for addiction. This class of drugs is habit-forming and [employer] regulated. The sedative effects of opioids can be potentiated by taking alcohol or any sleeping pills, along with opioids. The decision to drive is patient’s responsibility, as opioids can affect his/her driving ability and ability to concentrate. The long-term place is not clear, however, patient understands that once the pain control optimizes, the goal will be to wean off the opioids. All the issues regarding opioid treatment have been addressed satisfactorily.

## 2020-10-20 NOTE — PHYSICAL EXAM
[General Appearance - Alert] : alert [] : normal voice and communication [General Appearance - Well-Appearing] : healthy appearing [Oriented To Time, Place, And Person] : oriented to person, place, and time [Sclera] : the sclera and conjunctiva were normal

## 2020-10-20 NOTE — HISTORY OF PRESENT ILLNESS
[FreeTextEntry1] : Pt returns today for a followup visit .  \par Reports the change to cooler weather increases her back pain . SHe has had a difficult month with pain. \par No signs of toxicity noted . Pt has increased dose of Lyrica - denies any adverse effects. \par Denies any new symptoms. \par

## 2020-10-20 NOTE — REVIEW OF SYSTEMS
[Fever] : no fever [Chills] : no chills [Palpitations] : no palpitations [Chest Pain] : no chest pain [Lower Back Pain] : lower back pain [Neck Pain] : neck pain [Dizziness] : no dizziness [Fainting] : no fainting

## 2020-12-30 ENCOUNTER — APPOINTMENT (OUTPATIENT)
Dept: PAIN MANAGEMENT | Facility: CLINIC | Age: 50
End: 2020-12-30

## 2021-03-01 ENCOUNTER — APPOINTMENT (OUTPATIENT)
Dept: PAIN MANAGEMENT | Facility: CLINIC | Age: 51
End: 2021-03-01
Payer: COMMERCIAL

## 2021-03-01 PROCEDURE — 99212 OFFICE O/P EST SF 10 MIN: CPT | Mod: 95

## 2021-03-01 NOTE — REVIEW OF SYSTEMS
[Fever] : no fever [Chills] : no chills [Chest Pain] : no chest pain [Palpitations] : no palpitations [Shortness Of Breath] : no shortness of breath [Constipation] : no constipation [Lower Back Pain] : lower back pain [Dizziness] : no dizziness

## 2021-03-01 NOTE — PHYSICAL EXAM
[General Appearance - Alert] : alert [General Appearance - Well-Appearing] : healthy appearing [] : normal voice and communication [Oriented To Time, Place, And Person] : oriented to person, place, and time [Affect] : the affect was normal [Mood] : the mood was normal [Sclera] : the sclera and conjunctiva were normal

## 2021-03-01 NOTE — ASSESSMENT
[FreeTextEntry1] : Discussed with patient decreasing to from Morphine 30//15mg  to Morphine 15//15mg in May . \par Will also add Nabumetone 750mg BID for 7-14 days. \par  Pt is in agreement with this plan. \par Pt to schedule an in person visit in April. \par \par Dr Franks on site , billed incident to service. \par

## 2021-03-01 NOTE — REASON FOR VISIT
[Home] : at home, [unfilled] , at the time of the visit. [Medical Office: ([facility])___] : at the medical office located in  [Verbal consent obtained from patient] : the patient, [unfilled] [Follow-Up: _____] : a [unfilled] follow-up visit

## 2021-03-01 NOTE — HISTORY OF PRESENT ILLNESS
[FreeTextEntry1] : Pt returns today via TEB. \par Continues to have lower back pain - worse in cold , wet weather.\par Does reports she is up and about everyday. \par No signs of toxicity noted. \par Pt denies alcohol use. \par \par ISTOP # [id number]\par

## 2021-04-21 ENCOUNTER — APPOINTMENT (OUTPATIENT)
Dept: PAIN MANAGEMENT | Facility: CLINIC | Age: 51
End: 2021-04-21
Payer: MEDICARE

## 2021-04-21 VITALS
BODY MASS INDEX: 43.54 KG/M2 | SYSTOLIC BLOOD PRESSURE: 139 MMHG | WEIGHT: 255 LBS | DIASTOLIC BLOOD PRESSURE: 88 MMHG | HEART RATE: 84 BPM | HEIGHT: 64 IN

## 2021-04-21 VITALS — TEMPERATURE: 97.5 F

## 2021-04-21 PROCEDURE — 99212 OFFICE O/P EST SF 10 MIN: CPT

## 2021-04-21 PROCEDURE — 99072 ADDL SUPL MATRL&STAF TM PHE: CPT

## 2021-04-21 NOTE — HISTORY OF PRESENT ILLNESS
[FreeTextEntry1] : Pt returns today for a pain management follo wup .\par ISTOP # [id number]\par No sigsn of toxicity noted . \par Pt denies alcohol use. \par Mood has been stable . \par Pt continues to have lower back pain .\par Does report some urinary urgency and bowel urgwncy .\par Discussed going for a lumbar MRI - pt reports she has had an MRI over the last year and will get results sent to our office . \par Discussed pt in May we will plan to decrease Morphine er t 15mg BID 9 from 15  //30mg / day \par Will also plan to add Nabumetone for 7-14 days to help with the decrease in pain meds. \par \par Long discussion regarding weight loss done.

## 2021-04-21 NOTE — REVIEW OF SYSTEMS
[Fever] : no fever [Chills] : no chills [Chest Pain] : no chest pain [Palpitations] : no palpitations [Shortness Of Breath] : no shortness of breath [Lower Back Pain] : lower back pain [Dizziness] : no dizziness [Fainting] : no fainting

## 2021-04-21 NOTE — ASSESSMENT
[FreeTextEntry1] : No change in meds today , in May will decrease Morphine er15mg BID \par  [Opioids] : Patient was explained in detail about pain control by using opioids. Patient has signed and fully understands our guidelines for medication and drug screening.  Patient understands the side effects of opioids, including, but not limited to, drug tolerance, dependence, potential for addiction. This class of drugs is habit-forming and [employer] regulated. The sedative effects of opioids can be potentiated by taking alcohol or any sleeping pills, along with opioids. The decision to drive is patient’s responsibility, as opioids can affect his/her driving ability and ability to concentrate. The long-term place is not clear, however, patient understands that once the pain control optimizes, the goal will be to wean off the opioids. All the issues regarding opioid treatment have been addressed satisfactorily.

## 2021-04-21 NOTE — PHYSICAL EXAM
[General Appearance - Alert] : alert [General Appearance - Well-Appearing] : healthy appearing [Oriented To Time, Place, And Person] : oriented to person, place, and time [Affect] : the affect was normal [Mood] : the mood was normal [Paresis Pronator Drift Right-Sided] : no pronator drift on the right [Paresis Pronator Drift Left-Sided] : no pronator drift on the left [Motor Strength Upper Extremities Bilaterally] : strength was normal in both upper extremities [Motor Strength Lower Extremities Bilaterally] : strength was normal in both lower extremities [Abnormal Walk] : normal gait [Sclera] : the sclera and conjunctiva were normal [] : no respiratory distress

## 2021-07-16 ENCOUNTER — APPOINTMENT (OUTPATIENT)
Dept: PAIN MANAGEMENT | Facility: CLINIC | Age: 51
End: 2021-07-16
Payer: COMMERCIAL

## 2021-07-16 VITALS
HEART RATE: 84 BPM | HEIGHT: 64 IN | WEIGHT: 220 LBS | BODY MASS INDEX: 37.56 KG/M2 | SYSTOLIC BLOOD PRESSURE: 160 MMHG | DIASTOLIC BLOOD PRESSURE: 80 MMHG

## 2021-07-16 PROCEDURE — 99212 OFFICE O/P EST SF 10 MIN: CPT

## 2021-07-16 PROCEDURE — 99072 ADDL SUPL MATRL&STAF TM PHE: CPT

## 2021-07-16 NOTE — PHYSICAL EXAM
[General Appearance - Alert] : alert [General Appearance - Well-Appearing] : healthy appearing [Oriented To Time, Place, And Person] : oriented to person, place, and time [Affect] : the affect was normal [Mood] : the mood was normal [Motor Strength] : muscle strength was normal in all four extremities [Motor Strength Lower Extremities Bilaterally] : strength was normal in both lower extremities [Abnormal Walk] : normal gait [Sclera] : the sclera and conjunctiva were normal [] : no respiratory distress

## 2021-07-16 NOTE — HISTORY OF PRESENT ILLNESS
[FreeTextEntry1] : ISTOP # [id number]\par Lower back pain and migraines. \par Pt continues to have lower back pain - taking Morphine er 30mg and Morphine ER  15mg ( decreased to this dose from 30mg BID 2 months ago) and Oxycodone 10mg BID. . Pt has adjusted to decrease in opioid dose , although at first it was difficult . \par Bladder and bowel urgency has improved . \par \par Migraine : pt reports Nurtec is fast acting and helpful to abort migraine \par \par No signs of toxicity noted \par Mood has been stable . \par Pt denies alcohol use. \par No signs of aberrant behavior. \par

## 2021-07-16 NOTE — ASSESSMENT
[FreeTextEntry1] : No changes to day . \par I will continue to monitor ISTOP and for signs of toxicity. \par RTO 1 month - TEB is ok. \par \par Dr Franks on site , billed incident to service.  [Opioids] : Patient was explained in detail about pain control by using opioids. Patient has signed and fully understands our guidelines for medication and drug screening.  Patient understands the side effects of opioids, including, but not limited to, drug tolerance, dependence, potential for addiction. This class of drugs is habit-forming and [employer] regulated. The sedative effects of opioids can be potentiated by taking alcohol or any sleeping pills, along with opioids. The decision to drive is patient’s responsibility, as opioids can affect his/her driving ability and ability to concentrate. The long-term place is not clear, however, patient understands that once the pain control optimizes, the goal will be to wean off the opioids. All the issues regarding opioid treatment have been addressed satisfactorily.

## 2021-08-10 ENCOUNTER — NON-APPOINTMENT (OUTPATIENT)
Age: 51
End: 2021-08-10

## 2021-08-16 ENCOUNTER — APPOINTMENT (OUTPATIENT)
Dept: PAIN MANAGEMENT | Facility: CLINIC | Age: 51
End: 2021-08-16
Payer: COMMERCIAL

## 2021-08-16 PROCEDURE — 99212 OFFICE O/P EST SF 10 MIN: CPT | Mod: 95

## 2021-08-16 NOTE — PHYSICAL EXAM
[General Appearance - Alert] : alert [General Appearance - Well-Appearing] : healthy appearing [Oriented To Time, Place, And Person] : oriented to person, place, and time [Mood] : the mood was normal [Affect] : the affect was normal [Sclera] : the sclera and conjunctiva were normal [FreeTextEntry8] : Moving all extremities [] : no respiratory distress

## 2021-08-16 NOTE — HISTORY OF PRESENT ILLNESS
[FreeTextEntry1] : ISTOP # [id number]\par Dx: Lower back pain , s/p Lumbar Fusion. \par Pt continues to have lower back pain that radiates down legs. Ms. Martinez is trying to be more active . , has been walking down her block everyday. \par Mood has been stable . \par Denies constipation .\par Pt reports she has had blood in her stool and will be having a colonoscopy soon .\par Denies alcohol use. \par

## 2021-08-16 NOTE — ASSESSMENT
[FreeTextEntry1] : No changes today . \par I will continue to monitor ISTOP \par RTo 1 month - TEB is ok\par Pt asked to schedule yearly appt with Dr Gan.  [Opioids] : Patient was explained in detail about pain control by using opioids. Patient has signed and fully understands our guidelines for medication and drug screening.  Patient understands the side effects of opioids, including, but not limited to, drug tolerance, dependence, potential for addiction. This class of drugs is habit-forming and [employer] regulated. The sedative effects of opioids can be potentiated by taking alcohol or any sleeping pills, along with opioids. The decision to drive is patient’s responsibility, as opioids can affect his/her driving ability and ability to concentrate. The long-term place is not clear, however, patient understands that once the pain control optimizes, the goal will be to wean off the opioids. All the issues regarding opioid treatment have been addressed satisfactorily.

## 2021-08-16 NOTE — REVIEW OF SYSTEMS
[Fever] : no fever [Chest Pain] : no chest pain [Palpitations] : no palpitations [Lower Back Pain] : lower back pain [Limb Pain] : limb pain

## 2021-09-28 ENCOUNTER — APPOINTMENT (OUTPATIENT)
Dept: PAIN MANAGEMENT | Facility: CLINIC | Age: 51
End: 2021-09-28
Payer: COMMERCIAL

## 2021-09-28 ENCOUNTER — NON-APPOINTMENT (OUTPATIENT)
Age: 51
End: 2021-09-28

## 2021-09-28 PROCEDURE — 99212 OFFICE O/P EST SF 10 MIN: CPT | Mod: 95

## 2021-09-28 NOTE — ASSESSMENT
[FreeTextEntry1] : No changes today . \par Mood has been stable. RTO 1 month \par I will continue to monitor ISTOP  [Opioids] : Patient was explained in detail about pain control by using opioids. Patient has signed and fully understands our guidelines for medication and drug screening.  Patient understands the side effects of opioids, including, but not limited to, drug tolerance, dependence, potential for addiction. This class of drugs is habit-forming and [employer] regulated. The sedative effects of opioids can be potentiated by taking alcohol or any sleeping pills, along with opioids. The decision to drive is patient’s responsibility, as opioids can affect his/her driving ability and ability to concentrate. The long-term place is not clear, however, patient understands that once the pain control optimizes, the goal will be to wean off the opioids. All the issues regarding opioid treatment have been addressed satisfactorily.

## 2021-09-28 NOTE — HISTORY OF PRESENT ILLNESS
[FreeTextEntry1] : istop#[id number]\par Pt continues to have lower back pain . \par Had a colonoscopy and reports to me is was normal .\par Trying to increase activity daily . \par No signs of toxicity noted .\par Mood has been stable. \par \par Pt has a migraine today - just took Nurtec.

## 2021-09-28 NOTE — PHYSICAL EXAM
[General Appearance - Alert] : alert [General Appearance - Well-Appearing] : healthy appearing [Oriented To Time, Place, And Person] : oriented to person, place, and time [Affect] : the affect was normal [Mood] : the mood was normal [Sclera] : the sclera and conjunctiva were normal [] : no respiratory distress

## 2021-10-28 ENCOUNTER — APPOINTMENT (OUTPATIENT)
Dept: PAIN MANAGEMENT | Facility: CLINIC | Age: 51
End: 2021-10-28

## 2021-11-12 ENCOUNTER — APPOINTMENT (OUTPATIENT)
Dept: PAIN MANAGEMENT | Facility: CLINIC | Age: 51
End: 2021-11-12
Payer: COMMERCIAL

## 2021-11-12 VITALS
WEIGHT: 293 LBS | HEIGHT: 64 IN | DIASTOLIC BLOOD PRESSURE: 82 MMHG | HEART RATE: 78 BPM | BODY MASS INDEX: 50.02 KG/M2 | SYSTOLIC BLOOD PRESSURE: 155 MMHG

## 2021-11-12 DIAGNOSIS — M54.50 LOW BACK PAIN, UNSPECIFIED: ICD-10-CM

## 2021-11-12 PROCEDURE — 99212 OFFICE O/P EST SF 10 MIN: CPT

## 2021-11-12 RX ORDER — MORPHINE SULFATE 30 MG/1
30 TABLET, FILM COATED, EXTENDED RELEASE ORAL
Qty: 30 | Refills: 0 | Status: DISCONTINUED | COMMUNITY
Start: 2020-04-21 | End: 2021-11-12

## 2021-11-12 NOTE — PHYSICAL EXAM
[General Appearance - Alert] : alert [General Appearance - Well-Appearing] : healthy appearing [Oriented To Time, Place, And Person] : oriented to person, place, and time [Affect] : the affect was normal [Mood] : the mood was normal [Motor Strength Lower Extremities Bilaterally] : strength was normal in both lower extremities [2+] : Patella left 2+ [Sclera] : the sclera and conjunctiva were normal [] : no respiratory distress

## 2021-11-12 NOTE — HISTORY OF PRESENT ILLNESS
[FreeTextEntry1] : ISTOP#[id number]\par Pt returns today for a follow up appt . \par Nurtec has been helpful for migraine . \par \par Continues to have lower back pain .\par Taking Morphine er #30mg QD and Morphine ER 15mg QD, Oxycodone 10mg 4x/day.\par Denies any new symtoms. \par No signs of toxicity noted . \par \par Discussed with pt trial of decrease in Morphine er to 15mg BID - pt is agreeable to this plan. \par

## 2021-11-12 NOTE — ASSESSMENT
[FreeTextEntry1] : Decrease Morphine ER by 15mg/day .\par Stop Morphine ER 30mg 1x/day , Start Morphine 15mg BID. \par No change to Oxycodone 10mg 4x/day. \par RTO 1 month \par \par Dr Franks on site today. \par  [Opioids] : Patient was explained in detail about pain control by using opioids. Patient has signed and fully understands our guidelines for medication and drug screening.  Patient understands the side effects of opioids, including, but not limited to, drug tolerance, dependence, potential for addiction. This class of drugs is habit-forming and [employer] regulated. The sedative effects of opioids can be potentiated by taking alcohol or any sleeping pills, along with opioids. The decision to drive is patient’s responsibility, as opioids can affect his/her driving ability and ability to concentrate. The long-term place is not clear, however, patient understands that once the pain control optimizes, the goal will be to wean off the opioids. All the issues regarding opioid treatment have been addressed satisfactorily.

## 2021-11-18 ENCOUNTER — APPOINTMENT (OUTPATIENT)
Dept: PAIN MANAGEMENT | Facility: CLINIC | Age: 51
End: 2021-11-18

## 2022-01-05 ENCOUNTER — APPOINTMENT (OUTPATIENT)
Dept: PAIN MANAGEMENT | Facility: CLINIC | Age: 52
End: 2022-01-05
Payer: COMMERCIAL

## 2022-01-05 PROCEDURE — 99212 OFFICE O/P EST SF 10 MIN: CPT | Mod: 95

## 2022-01-05 NOTE — ASSESSMENT
[FreeTextEntry1] : 50 y/o F with Pmhx Migraines and chronic lower back pain on opioids . She is planning bariatric surgery possibly gastric bypass in April 2022 and was asked to wean down off of pain meds as much as possible.  \par \par -Will taper off MSER 15 mg BID gradually . Will alternate 15mg daily and 15 mg BID x 7 days then decrease to 15 mg daily x 7 days then possibly d/c . \par -Continue Oxycodone 10 mg BID \par -cyclobenzaprine 10 mg prn  . She has not been taking regularly and advised to at least take hs dose while tapering down meds to help with pain and sleep. \par - Continue pregabalin 100 mg BID and consider increasing depending on above response.  \par I-Stop reviewed,  reference #: [id number]\par No signs of aberrant behavior and will continue to monitor for signs of toxicity.\par Reminded to continue to avoid alcohol.\par fu in 1 month \par \par I am seeing BENITO ANDERSON as incident to service. Dr. Franks is present in the office suite immediately available and able to provide assistance and direction throughout the time the service was performed.\par \par  [Opioids] : Patient was explained in detail about pain control by using opioids. Patient has signed and fully understands our guidelines for medication and drug screening.  Patient understands the side effects of opioids, including, but not limited to, drug tolerance, dependence, potential for addiction. This class of drugs is habit-forming and [employer] regulated. The sedative effects of opioids can be potentiated by taking alcohol or any sleeping pills, along with opioids. The decision to drive is patient’s responsibility, as opioids can affect his/her driving ability and ability to concentrate. The long-term place is not clear, however, patient understands that once the pain control optimizes, the goal will be to wean off the opioids. All the issues regarding opioid treatment have been addressed satisfactorily.

## 2022-01-05 NOTE — PHYSICAL EXAM
[General Appearance - Alert] : alert [General Appearance - Well Nourished] : well nourished [General Appearance - Well Developed] : well developed [Oriented To Time, Place, And Person] : oriented to person, place, and time [Affect] : the affect was normal [Mood] : the mood was normal [FreeTextEntry1] : no signs of toxicity

## 2022-01-05 NOTE — HISTORY OF PRESENT ILLNESS
[Home] : at home, [unfilled] , at the time of the visit. [Medical Office: ([facility])___] : at the medical office located in  [Verbal consent obtained from patient] : the patient, [unfilled] [FreeTextEntry1] : 52 y/o F with Pmhx Migraines and chronic lower back pain on opioids who is being seen via telemedicine for follow up.  Overall she has been feeling about the same as her last visit.  She has not noted a significant change in her pain since decreasing her pain meds.  She is planning bariatric surgery possibly gastric bypass in April 2022 and was asked to wean down off of pain meds as much as possible.  Pain in lower back is constant 5/10 on average but up to 7/10 with cold weather. \par \par Current meds: MSER 15 mg BID, Oxycodone 10 mg BID , pregabalin 100mg/BID, cyclobenzaprine 10 mg BID prn

## 2022-02-05 ENCOUNTER — RESULT REVIEW (OUTPATIENT)
Age: 52
End: 2022-02-05

## 2022-02-10 ENCOUNTER — APPOINTMENT (OUTPATIENT)
Dept: PAIN MANAGEMENT | Facility: CLINIC | Age: 52
End: 2022-02-10
Payer: COMMERCIAL

## 2022-02-10 VITALS
HEIGHT: 64 IN | BODY MASS INDEX: 47.46 KG/M2 | SYSTOLIC BLOOD PRESSURE: 150 MMHG | DIASTOLIC BLOOD PRESSURE: 95 MMHG | HEART RATE: 90 BPM | WEIGHT: 278 LBS

## 2022-02-10 DIAGNOSIS — M54.50 LOW BACK PAIN, UNSPECIFIED: ICD-10-CM

## 2022-02-10 PROCEDURE — 99213 OFFICE O/P EST LOW 20 MIN: CPT

## 2022-02-10 NOTE — ASSESSMENT
[FreeTextEntry1] : ISTOP#[id number].\par Decrease Morphine ER to 30 tab / 30 days \par  No change in Oxycodone . \par Clearance letter for gastric by pass surgery . \par RTO 1 month - TEB is ok.\par \par Dr Franks on site , billed incident to service. \par

## 2022-02-10 NOTE — HISTORY OF PRESENT ILLNESS
[FreeTextEntry1] : Pt returns  today for a follow up visit for lower back pain. \par She is slowly decreasing Morphine ER . Today we will decrease to 1x/ day .Pain has increased to lower back but she is able to manage. Denies any new symptoms. \par Pt is in the process of scheduling bariatric surgery(by pass ). \par \par  [____ / 10] : [unfilled]/10 [] : No

## 2022-02-10 NOTE — REVIEW OF SYSTEMS
[Fever] : no fever [Chills] : no chills [Chest Pain] : no chest pain [Palpitations] : no palpitations [Constipation] : no constipation [Dizziness] : no dizziness [Fainting] : no fainting

## 2022-02-10 NOTE — ASSESSMENT
[FreeTextEntry1] : ISTOP#[id number].\par Decrease Morphine ER to 30 tab / 30 days \par  No change in Oxycodone . \par Clearance letter for gastric by pass surgery . \par RTO 1 month - TEB is ok.\par \par Dr Franks on site , billed incident to service. \par

## 2022-02-10 NOTE — PHYSICAL EXAM
[General Appearance - Alert] : alert [Oriented To Time, Place, And Person] : oriented to person, place, and time [Affect] : the affect was normal [Mood] : the mood was normal [Motor Strength] : muscle strength was normal in all four extremities [2+] : Patella left 2+ [Sclera] : the sclera and conjunctiva were normal [] : no respiratory distress [Abnormal Walk] : normal gait

## 2022-03-10 ENCOUNTER — APPOINTMENT (OUTPATIENT)
Dept: PAIN MANAGEMENT | Facility: CLINIC | Age: 52
End: 2022-03-10
Payer: COMMERCIAL

## 2022-03-10 PROCEDURE — 99213 OFFICE O/P EST LOW 20 MIN: CPT | Mod: 95

## 2022-03-10 NOTE — ASSESSMENT
[FreeTextEntry1] : No changes this month \par RTO 1 month - TEB \par  [Opioids] : Patient was explained in detail about pain control by using opioids. Patient has signed and fully understands our guidelines for medication and drug screening.  Patient understands the side effects of opioids, including, but not limited to, drug tolerance, dependence, potential for addiction. This class of drugs is habit-forming and [employer] regulated. The sedative effects of opioids can be potentiated by taking alcohol or any sleeping pills, along with opioids. The decision to drive is patient’s responsibility, as opioids can affect his/her driving ability and ability to concentrate. The long-term place is not clear, however, patient understands that once the pain control optimizes, the goal will be to wean off the opioids. All the issues regarding opioid treatment have been addressed satisfactorily.

## 2022-03-10 NOTE — HISTORY OF PRESENT ILLNESS
[FreeTextEntry1] : istop#[id number]\par Pt returns today for a TEB pain management follow up appt. \par Continues to have chronic lower back pain .\par Last month Morphine decreased to 1x/ day . Pt reports it is "rough but pushing through" . \par Planning on bariatric surgery in April.  [____ / 10] : [unfilled]/10 [] : No

## 2022-03-10 NOTE — REVIEW OF SYSTEMS
[Fever] : no fever [Chills] : no chills [Constipation] : no constipation [Lower Back Pain] : lower back pain

## 2022-05-11 ENCOUNTER — APPOINTMENT (OUTPATIENT)
Dept: PAIN MANAGEMENT | Facility: CLINIC | Age: 52
End: 2022-05-11
Payer: COMMERCIAL

## 2022-05-11 VITALS
HEART RATE: 77 BPM | DIASTOLIC BLOOD PRESSURE: 74 MMHG | HEIGHT: 64 IN | SYSTOLIC BLOOD PRESSURE: 121 MMHG | WEIGHT: 240 LBS | BODY MASS INDEX: 40.97 KG/M2

## 2022-05-11 PROCEDURE — 99213 OFFICE O/P EST LOW 20 MIN: CPT

## 2022-05-11 RX ORDER — CYCLOBENZAPRINE HYDROCHLORIDE 10 MG/1
10 TABLET, FILM COATED ORAL
Qty: 60 | Refills: 2 | Status: DISCONTINUED | COMMUNITY
Start: 2019-04-22 | End: 2022-05-11

## 2022-05-11 NOTE — PHYSICAL EXAM
[General Appearance - Alert] : alert [General Appearance - Well-Appearing] : healthy appearing [Oriented To Time, Place, And Person] : oriented to person, place, and time [Affect] : the affect was normal [Mood] : the mood was normal [Cranial Nerves Facial (VII)] : face symmetrical [Cranial Nerves Accessory (XI - Cranial And Spinal)] : head turning and shoulder shrug symmetric [Cranial Nerves Hypoglossal (XII)] : there was no tongue deviation with protrusion [Motor Strength] : muscle strength was normal in all four extremities [Motor Strength Lower Extremities Bilaterally] : strength was normal in both lower extremities [Sclera] : the sclera and conjunctiva were normal [] : no respiratory distress

## 2022-05-11 NOTE — HISTORY OF PRESENT ILLNESS
[FreeTextEntry1] : ISTOP#[id number]\par Pt returns today for a followup appt.\par SHe continues to  have lower back pain. \par \par Pt is 3 weeks s/p gastric bypass surgery for weight loss. She was prescribed 10 tablets of Percocet by her surgeon post -operatively but did not take them .Pt reports she has lost 20 pounds since surgery. \par Discussed with patient at next refill we will plan to decrease Morphine dose. \par   [____ / 10] : [unfilled]/10 [] : No

## 2022-05-11 NOTE — ASSESSMENT
[FreeTextEntry1] : Next refill decrease Morphine dose. \par RTO 1 month \par UDS today\par Dr Franks on site , billed incident to service.  [Opioids] : Patient was explained in detail about pain control by using opioids. Patient has signed and fully understands our guidelines for medication and drug screening.  Patient understands the side effects of opioids, including, but not limited to, drug tolerance, dependence, potential for addiction. This class of drugs is habit-forming and [place] regulated. The sedative effects of opioids can be potentiated by taking alcohol or any sleeping pills, along with opioids. The decision to drive is patient’s responsibility, as opioids can affect his/her driving ability and ability to concentrate. The long-term place is not clear, however, patient understands that once the pain control optimizes, the goal will be to wean off the opioids. All the issues regarding opioid treatment have been addressed satisfactorily.

## 2022-06-15 ENCOUNTER — APPOINTMENT (OUTPATIENT)
Dept: PAIN MANAGEMENT | Facility: CLINIC | Age: 52
End: 2022-06-15
Payer: COMMERCIAL

## 2022-06-15 VITALS
DIASTOLIC BLOOD PRESSURE: 78 MMHG | RESPIRATION RATE: 16 BRPM | SYSTOLIC BLOOD PRESSURE: 141 MMHG | HEIGHT: 64 IN | HEART RATE: 69 BPM

## 2022-06-15 PROCEDURE — 99213 OFFICE O/P EST LOW 20 MIN: CPT

## 2022-06-15 NOTE — HISTORY OF PRESENT ILLNESS
[FreeTextEntry1] : 53 y/o F with Pmhx Migraines and chronic lower back pain on opioids who is being seen via telemedicine for follow up.  She is now 2 months post gastric bypass and down a total of 68 lbs since October 2021.  She continues to have pain daily for most of the day, stiffness in AM that improves as time goes on but waking her us from sleep.  Pain 4-5/10 on average, increased with activity. \par \par Prior meds: D/c flexeril (GI) \par Current meds: MSER 15 mg daily, Oxycodone 10 mg BID , pregabalin 100 mg BID,

## 2022-06-15 NOTE — ASSESSMENT
[Opioids] : Patient was explained in detail about pain control by using opioids. Patient has signed and fully understands our guidelines for medication and drug screening.  Patient understands the side effects of opioids, including, but not limited to, drug tolerance, dependence, potential for addiction. This class of drugs is habit-forming and [employer] regulated. The sedative effects of opioids can be potentiated by taking alcohol or any sleeping pills, along with opioids. The decision to drive is patient’s responsibility, as opioids can affect his/her driving ability and ability to concentrate. The long-term place is not clear, however, patient understands that once the pain control optimizes, the goal will be to wean off the opioids. All the issues regarding opioid treatment have been addressed satisfactorily.  [FreeTextEntry1] : 53 y/o F with Pmhx Migraines and chronic lower back pain \par \par -Will taper off MSER 15 mg daily filled but will try to take alternating days and then dc. \par -Continue Oxycodone 10 mg BID \par - Increased pregabalin 100 mg BID to TID \par TPI L spine \par restart PT on subsequent visit .  \par I-Stop reviewed,  reference #: [id number]\par No signs of aberrant behavior and will continue to monitor for signs of toxicity.\par Reminded to continue to avoid alcohol.\par fu in 1 month \par \par I am seeing BENITO ANDERSON as incident to service. Dr. Franks is present in the office suite immediately available and able to provide assistance and direction throughout the time the service was performed.\par \par

## 2022-08-19 ENCOUNTER — APPOINTMENT (OUTPATIENT)
Dept: PAIN MANAGEMENT | Facility: CLINIC | Age: 52
End: 2022-08-19

## 2022-08-19 PROCEDURE — 99212 OFFICE O/P EST SF 10 MIN: CPT | Mod: 95

## 2022-08-19 NOTE — HISTORY OF PRESENT ILLNESS
[FreeTextEntry1] : 51 y/o F with Pmhx Migraines and chronic lower back pain on opioids who is being seen via telemedicine for follow up.  Patient reports no new medical issues, approx 90 lbs weight loss.  She is tolerating increased dosage of pregabalin to 300 mg/day without benefit.  She attempted to decrease the MSER and is now taking 5-6x/week on average.   She continues to have pain daily for most of the day, stiffness in AM that improves as time goes on but waking her us from sleep.  Pain 4-5/10 on average, increased with activity. \par \par \par Prior meds: D/c Flexeril (GI) \par Current meds: MSER 15 mg daily, Oxycodone 10 mg BID , pregabalin 100 mg BID,

## 2022-08-19 NOTE — REASON FOR VISIT
[Home] : at home, [unfilled] , at the time of the visit. [Medical Office: ([facility])___] : at the medical office located in  [Patient] : the patient [Follow-Up: _____] : a [unfilled] follow-up visit

## 2022-08-19 NOTE — PHYSICAL EXAM
[General Appearance - Alert] : alert [Oriented To Time, Place, And Person] : oriented to person, place, and time [Affect] : the affect was normal [Mood] : the mood was normal [FreeTextEntry1] : no signs of toxicity

## 2022-08-19 NOTE — ASSESSMENT
[Opioids] : Patient was explained in detail about pain control by using opioids. Patient has signed and fully understands our guidelines for medication and drug screening.  Patient understands the side effects of opioids, including, but not limited to, drug tolerance, dependence, potential for addiction. This class of drugs is habit-forming and [employer] regulated. The sedative effects of opioids can be potentiated by taking alcohol or any sleeping pills, along with opioids. The decision to drive is patient’s responsibility, as opioids can affect his/her driving ability and ability to concentrate. The long-term place is not clear, however, patient understands that once the pain control optimizes, the goal will be to wean off the opioids. All the issues regarding opioid treatment have been addressed satisfactorily.  [FreeTextEntry1] : 51 y/o F with Pmhx Migraines and chronic lower back pain \par \par -Will taper off MSER 15 mg daily , will take every other day and will then d/c \par -Continue Oxycodone 10 mg BID \par - continue pregabalin 100 mg BID to TID \par TPI L spine \par restart PT on subsequent visit .  \par I-Stop reviewed,  reference #: [id number]\par No signs of aberrant behavior and will continue to monitor for signs of toxicity.\par Reminded to continue to avoid alcohol.\par \par \par

## 2022-08-30 ENCOUNTER — APPOINTMENT (OUTPATIENT)
Dept: PAIN MANAGEMENT | Facility: CLINIC | Age: 52
End: 2022-08-30

## 2022-08-30 VITALS
HEIGHT: 64 IN | WEIGHT: 202 LBS | HEART RATE: 64 BPM | DIASTOLIC BLOOD PRESSURE: 80 MMHG | BODY MASS INDEX: 34.49 KG/M2 | SYSTOLIC BLOOD PRESSURE: 117 MMHG

## 2022-08-30 PROCEDURE — 99213 OFFICE O/P EST LOW 20 MIN: CPT

## 2022-08-30 RX ORDER — MORPHINE SULFATE 15 MG/1
15 TABLET, FILM COATED, EXTENDED RELEASE ORAL DAILY
Qty: 30 | Refills: 0 | Status: DISCONTINUED | COMMUNITY
Start: 2020-04-21 | End: 2022-08-30

## 2022-09-07 ENCOUNTER — APPOINTMENT (OUTPATIENT)
Dept: PAIN MANAGEMENT | Facility: CLINIC | Age: 52
End: 2022-09-07

## 2022-10-03 ENCOUNTER — APPOINTMENT (OUTPATIENT)
Dept: PAIN MANAGEMENT | Facility: CLINIC | Age: 52
End: 2022-10-03

## 2022-10-03 NOTE — ASSESSMENT
[Opioids] : Patient was explained in detail about pain control by using opioids. Patient has signed and fully understands our guidelines for medication and drug screening.  Patient understands the side effects of opioids, including, but not limited to, drug tolerance, dependence, potential for addiction. This class of drugs is habit-forming and [employer] regulated. The sedative effects of opioids can be potentiated by taking alcohol or any sleeping pills, along with opioids. The decision to drive is patient’s responsibility, as opioids can affect his/her driving ability and ability to concentrate. The long-term place is not clear, however, patient understands that once the pain control optimizes, the goal will be to wean off the opioids. All the issues regarding opioid treatment have been addressed satisfactorily.  [FreeTextEntry1] : 51 y/o F with Pmhx Migraines and chronic lower back pain \par \par Discussed in detail the nature of chronic pain as well as chronic opioid use for non-malignant pain. Continued use of opioid are not recommended and can potentially lead to hyperalgesia, tolerance and addiction. Additionally, we extensively discussed the risks possible AE when taking opioids including respiratory suppression and death. We have discussed the importance of exploring nonopioid pain management including physical therapy, massage, acupuncture, TPI. \par \par TPI B/L lumbar spine performed today. \par -D/c MSER 15 mg daily \par -Continue Oxycodone 10 mg BID \par - increase pregabalin 100 mg TID gradually to 150 mg TID \par - trial of cyclobenzaprine \par restart PT but reports cannot afford  .  \par UDS 5/2022 reviewed and c/w prescribed meds \par I-Stop reviewed,  reference #: [id number]\par No signs of aberrant behavior and will continue to monitor for signs of toxicity.\par Reminded to continue to avoid alcohol.\par \par \par

## 2022-10-03 NOTE — PROCEDURE
[FreeTextEntry1] : The procedure risks, hazards and alternatives were discussed with the patient and appropriate consent was obtained. The area over the myofascial spasm / pain was prepped with alcohol utilizing sterile technique. After isolating it between two palpating fingertips a 27 gauge 1.5” needle was placed in the center of the myofascial spasm and a negative aspiration was performed. A total of 8 mL of 1% Lidocaine mixed with ketorolac 60mg was injected into 3 sites ( left lumbar x 2 and right lumbar x 1). The patient tolerated procedure well without any apparent difficulties or complications. \par \par BENITO ANDERSON was monitored in the office for 5 minutes after injections and tolerated procedure well without adverse events.\par

## 2022-10-03 NOTE — HISTORY OF PRESENT ILLNESS
[FreeTextEntry1] : 51 y/o F with Pmhx Migraines and chronic lower back pain on opioids who is follow up.  Patient reports no new medical issues, approx 90 lbs weight loss.  She is tolerating increased dosage of pregabalin to 300 mg/day without benefit.  She attempted to decrease the MSER and is now taking 5-6x/week on average.   She continues to have pain daily for most of the day, stiffness in AM that improves as time goes on but waking her us from sleep.  Pain 4-5/10 on average, increased with activity. \par \par Prior meds: D/c Flexeril (GI) \par Current meds: MSER 15 mg daily, Oxycodone 10 mg BID , pregabalin 100 mg BID,

## 2022-10-07 ENCOUNTER — APPOINTMENT (OUTPATIENT)
Dept: PAIN MANAGEMENT | Facility: CLINIC | Age: 52
End: 2022-10-07
Payer: COMMERCIAL

## 2022-10-07 VITALS
DIASTOLIC BLOOD PRESSURE: 79 MMHG | BODY MASS INDEX: 33.12 KG/M2 | SYSTOLIC BLOOD PRESSURE: 122 MMHG | HEART RATE: 70 BPM | WEIGHT: 194 LBS | HEIGHT: 64 IN

## 2022-10-07 PROCEDURE — 99213 OFFICE O/P EST LOW 20 MIN: CPT

## 2022-10-07 RX ORDER — NALOXONE HYDROCHLORIDE 4 MG/.1ML
4 SPRAY NASAL
Qty: 1 | Refills: 1 | Status: ACTIVE | COMMUNITY
Start: 2022-10-07 | End: 1900-01-01

## 2022-10-07 NOTE — ASSESSMENT
[Opioids] : Patient was explained in detail about pain control by using opioids. Patient has signed and fully understands our guidelines for medication and drug screening.  Patient understands the side effects of opioids, including, but not limited to, drug tolerance, dependence, potential for addiction. This class of drugs is habit-forming and [employer] regulated. The sedative effects of opioids can be potentiated by taking alcohol or any sleeping pills, along with opioids. The decision to drive is patient’s responsibility, as opioids can affect his/her driving ability and ability to concentrate. The long-term place is not clear, however, patient understands that once the pain control optimizes, the goal will be to wean off the opioids. All the issues regarding opioid treatment have been addressed satisfactorily.  [FreeTextEntry1] : 51 y/o F with Pmhx Migraines and chronic lower back pain \par \par Discussed in detail the nature of chronic pain as well as chronic opioid use for non-malignant pain. Continued use of opioid are not recommended and can potentially lead to hyperalgesia, tolerance and addiction. Additionally, we extensively discussed the risks possible AE when taking opioids including respiratory suppression and death. We have discussed the importance of exploring nonopioid pain management including physical therapy, massage, acupuncture, TPI. \par \par -Oxycodone 10 mg BID changed to 5 mg BID \par - increase pregabalin 150 mg TID \par - cyclobenzaprine \par -restart nortriptyline 10 mg q hs and increase to 20 mg in 1 week. ( pain and sleep)\par restart PT but reports cannot afford  .  \par UDS 5/2022 reviewed and c/w prescribed meds \par I-Stop reviewed,  reference #: [id number]\par No signs of aberrant behavior and will continue to monitor for signs of toxicity.\par Reminded to continue to avoid alcohol.\par \par I am seeing BENITO ANDERSON as incident to service. Dr. Franks is present in the office suite immediately available and able to provide assistance and direction throughout the time the service was performed.\par \par \par

## 2022-10-07 NOTE — HISTORY OF PRESENT ILLNESS
[FreeTextEntry1] : 53 y/o F with Pmhx Migraines and chronic lower back pain on opioids who is follow up.  Patient reports no new medical issues, approx 104 lbs weight loss following bariatric surgery. She reports she is not eating as much as her surgeon has told her to but is compliant with vitamins.  She is feeling tired overall, increased back pain lower back midline 6-7/10 but up to 9/10.  Stiffness in AM that improves as time goes on but waking her us from sleep. \par \par Prior meds: D/c Flexeril (GI) , MSER 15 mg daily\par Current meds:  Oxycodone 10 mg BID , pregabalin 150 mg TID , Cyclobenzaprine  , Nurtec prn

## 2022-11-22 ENCOUNTER — APPOINTMENT (OUTPATIENT)
Dept: PAIN MANAGEMENT | Facility: CLINIC | Age: 52
End: 2022-11-22
Payer: COMMERCIAL

## 2022-11-22 PROCEDURE — 99212 OFFICE O/P EST SF 10 MIN: CPT | Mod: 95

## 2022-11-22 NOTE — ASSESSMENT
[Opioids] : Patient was explained in detail about pain control by using opioids. Patient has signed and fully understands our guidelines for medication and drug screening.  Patient understands the side effects of opioids, including, but not limited to, drug tolerance, dependence, potential for addiction. This class of drugs is habit-forming and [employer] regulated. The sedative effects of opioids can be potentiated by taking alcohol or any sleeping pills, along with opioids. The decision to drive is patient’s responsibility, as opioids can affect his/her driving ability and ability to concentrate. The long-term place is not clear, however, patient understands that once the pain control optimizes, the goal will be to wean off the opioids. All the issues regarding opioid treatment have been addressed satisfactorily.  [FreeTextEntry1] : 53 y/o F with Pmhx Migraines and chronic lower back pain \par \par Discussed in detail the nature of chronic pain as well as chronic opioid use for non-malignant pain. Continued use of opioid are not recommended and can potentially lead to hyperalgesia, tolerance and addiction. Additionally, we extensively discussed the risks possible AE when taking opioids including respiratory suppression and death. We have discussed the importance of exploring nonopioid pain management including physical therapy, massage, acupuncture, TPI. \par \par -Oxycodone 5 mg 2x/day will be temporarily increase to 3x/day and will attempt to taper on subsequent visit. \par - increase pregabalin 150 mg TID \par - cyclobenzaprine \par -increase nortriptyline 20 mg and will increase to 30 mg q hs ( pain and sleep)\par restart PT but reports cannot afford  .  \par UDS 5/2022 reviewed and c/w prescribed meds \par I-Stop reviewed,  reference #: [id number]\par No signs of aberrant behavior and will continue to monitor for signs of toxicity.\par Reminded to continue to avoid alcohol.\par \par \par \par

## 2022-11-22 NOTE — HISTORY OF PRESENT ILLNESS
[FreeTextEntry1] : 51 y/o F with Pmhx Migraines and chronic lower back pain on opioids who is follow up. She reports pain in her lower back  midline that sometimes radiates to buttock and thighs, 7-8/10 on average, increased with activity and also worse in the cold weather  Her headaches have improved and is now having 2-3 per month on average for which she is taking Nurtec prn which helps. She restarted the Nortriptyline 20 mg qhs and feels she is sleeping a little better.  \par \par  Patient reports no new medical issues, approx 113 lbs weight loss following bariatric surgery. Refuses LESI as previously developed epidural abscess following epidural during labor.  \par \par \par Prior meds: D/c Flexeril (GI) , MSER 15 mg daily\par Current meds:  Oxycodone 5 mg BID , pregabalin 150 mg TID , Cyclobenzaprine  , Nurtec prn \par \par \par

## 2023-01-04 ENCOUNTER — APPOINTMENT (OUTPATIENT)
Dept: PAIN MANAGEMENT | Facility: CLINIC | Age: 53
End: 2023-01-04
Payer: COMMERCIAL

## 2023-01-04 VITALS
DIASTOLIC BLOOD PRESSURE: 86 MMHG | BODY MASS INDEX: 30.39 KG/M2 | HEIGHT: 64 IN | WEIGHT: 178 LBS | HEART RATE: 67 BPM | SYSTOLIC BLOOD PRESSURE: 150 MMHG

## 2023-01-04 PROCEDURE — 99213 OFFICE O/P EST LOW 20 MIN: CPT

## 2023-01-04 NOTE — ASSESSMENT
[Opioids] : Patient was explained in detail about pain control by using opioids. Patient has signed and fully understands our guidelines for medication and drug screening.  Patient understands the side effects of opioids, including, but not limited to, drug tolerance, dependence, potential for addiction. This class of drugs is habit-forming and [employer] regulated. The sedative effects of opioids can be potentiated by taking alcohol or any sleeping pills, along with opioids. The decision to drive is patient’s responsibility, as opioids can affect his/her driving ability and ability to concentrate. The long-term place is not clear, however, patient understands that once the pain control optimizes, the goal will be to wean off the opioids. All the issues regarding opioid treatment have been addressed satisfactorily.  [FreeTextEntry1] : 53 y/o F with Pmhx Migraines and chronic lower back pain \par \par Discussed in detail the nature of chronic pain as well as chronic opioid use for non-malignant pain. Continued use of opioid are not recommended and can potentially lead to hyperalgesia, tolerance and addiction. Additionally, we extensively discussed the risks possible AE when taking opioids including respiratory suppression and death. We have discussed the importance of exploring nonopioid pain management including physical therapy, massage, acupuncture, TPI. \par \par -Oxycodone 5 mg 3x/day decreased to 2-3x/day and will attempt to taper further gradually . \par - continue pregabalin 150 mg TID \par -nortriptyline 30 mg q hs ( pain and sleep) and will consider increasing to 40 mg q hs on next visit.\par - cyclobenzaprine \par restart PT but reports cannot afford  .  \par UDS 5/2022 reviewed and c/w prescribed meds \par I-Stop reviewed,  reference #: [id number]\par No signs of aberrant behavior and will continue to monitor for signs of toxicity.\par Reminded to continue to avoid alcohol.\par \par \par \par

## 2023-01-04 NOTE — HISTORY OF PRESENT ILLNESS
[FreeTextEntry1] : 51 y/o F with Pmhx Migraines and chronic lower back pain who is follow up. Today she reports feeling well overall but pain continues to fluctuate, typically worse in the cold weather.  Her lower back pain is almost constant midline that sometimes radiates to buttock and thighs, 5-6/10 on average, increased with activity and also worse in the cold weather.  Her headaches have improved and is now having 2-3 per month on average for which she is taking Nurtec prn which helps. She increased the Nortriptyline 30 mg qhs and feels she is sleeping a little better as well as Lyrica 150 mg 3x/day without AE.    Patient reports no new medical issues, approx 120 lbs weight loss following bariatric surgery. Refuses LESI as previously developed epidural abscess following epidural during labor.  \par \par Prior meds: D/c Flexeril (GI) , MSER 15 mg daily\par Current meds:  Oxycodone 5 mg BID , pregabalin 150 mg TID ,  nortriptyline 30 mg( pain and sleep), Cyclobenzaprine  , Nurtec prn \par \par \par

## 2023-02-14 ENCOUNTER — APPOINTMENT (OUTPATIENT)
Dept: PAIN MANAGEMENT | Facility: CLINIC | Age: 53
End: 2023-02-14
Payer: MEDICARE

## 2023-02-14 PROCEDURE — 99212 OFFICE O/P EST SF 10 MIN: CPT | Mod: 95

## 2023-02-14 NOTE — ASSESSMENT
[Opioids] : Patient was explained in detail about pain control by using opioids. Patient has signed and fully understands our guidelines for medication and drug screening.  Patient understands the side effects of opioids, including, but not limited to, drug tolerance, dependence, potential for addiction. This class of drugs is habit-forming and [employer] regulated. The sedative effects of opioids can be potentiated by taking alcohol or any sleeping pills, along with opioids. The decision to drive is patient’s responsibility, as opioids can affect his/her driving ability and ability to concentrate. The long-term place is not clear, however, patient understands that once the pain control optimizes, the goal will be to wean off the opioids. All the issues regarding opioid treatment have been addressed satisfactorily.  [FreeTextEntry1] : 51 y/o F with Pmhx Migraines and chronic lower back pain \par \par Discussed in detail the nature of chronic pain as well as chronic opioid use for non-malignant pain. Continued use of opioid are not recommended and can potentially lead to hyperalgesia, tolerance and addiction. Additionally, we extensively discussed the risks possible AE when taking opioids including respiratory suppression and death. We have discussed the importance of exploring nonopioid pain management including physical therapy, massage, acupuncture, TPI. \par \par -Oxycodone 5 mg 2-3x/day and will attempt to taper further gradually . \par - continue pregabalin 150 mg TID \par -nortriptyline 40mg q hs ( pain and sleep) and will consider increasing to 50mg q hs on next visit.\par - cyclobenzaprine \par restart PT but reports cannot afford  .  \par UDS 5/2022 reviewed and c/w prescribed meds \par I-Stop reviewed,  reference #: [id number]\par No signs of aberrant behavior and will continue to monitor for signs of toxicity.\par Reminded to continue to avoid alcohol.\par \par \par \par

## 2023-02-14 NOTE — REASON FOR VISIT
[Home] : at home, [unfilled] , at the time of the visit. [Medical Office: ([facility])___] : at the medical office located in  [Patient] : the patient [Follow-Up: _____] : a [unfilled] follow-up visit

## 2023-02-14 NOTE — PHYSICAL EXAM
[General Appearance - Well Nourished] : well nourished [General Appearance - Alert] : alert [General Appearance - Well Developed] : well developed [Oriented To Time, Place, And Person] : oriented to person, place, and time [Impaired Insight] : insight and judgment were intact [Affect] : the affect was normal [Mood] : the mood was normal [FreeTextEntry1] : no signs of toxicity

## 2023-03-10 RX ORDER — SUMATRIPTAN 100 MG/1
100 TABLET, FILM COATED ORAL
Qty: 12 | Refills: 2 | Status: ACTIVE | COMMUNITY
Start: 2020-04-07 | End: 1900-01-01

## 2023-04-04 ENCOUNTER — APPOINTMENT (OUTPATIENT)
Dept: PAIN MANAGEMENT | Facility: CLINIC | Age: 53
End: 2023-04-04
Payer: COMMERCIAL

## 2023-04-04 ENCOUNTER — NON-APPOINTMENT (OUTPATIENT)
Age: 53
End: 2023-04-04

## 2023-04-04 PROCEDURE — 99214 OFFICE O/P EST MOD 30 MIN: CPT

## 2023-04-04 NOTE — HISTORY OF PRESENT ILLNESS
[FreeTextEntry1] : 54 y/o F with Pmhx Gastric bypass, Migraines and chronic lower back pain who is follow up. Today she reports no new medical issues. She continues to reports chronic constant pain in lower back 5-6/10 on average , midline that sometimes radiates to buttock and thighs, increased with activity and also worse in the cold weather.  Her headaches have improved and is now having 2-3 per month on average for which she is taking Nurtec prn which helps, increased Nortriptyline 50mg qhs without AE.  Patient reports no new medical issues, approx 120 lbs weight loss following bariatric surgery. Refuses LESI as previously developed epidural abscess following epidural during labor.  \par \par Prior meds: D/c Flexeril (GI) , MSER 15 mg daily\par Current meds:  Oxycodone 5 mg 2x/day, pregabalin 150 mg TID ,  nortriptyline 50 mg( pain and sleep), Cyclobenzaprine  , Nurtec prn \par \par \par

## 2023-04-04 NOTE — ASSESSMENT
[FreeTextEntry1] : 53 y/o F with Pmhx Migraines and chronic lower back pain \par \par Discussed in detail the nature of chronic pain as well as chronic opioid use for non-malignant pain. Continued use of opioid are not recommended and can potentially lead to hyperalgesia, tolerance and addiction. Additionally, we extensively discussed the risks possible AE when taking opioids including respiratory suppression and death. We have discussed the importance of exploring nonopioid pain management including physical therapy, massage, acupuncture, TPI. \par \par -Oxycodone 5 mg 2-3x/day will be decreased to 2x/day. \par - continue pregabalin 150 mg TID \par -nortriptyline 50mg q hs ( pain and sleep). \par - cyclobenzaprine prn \par TPI on next visit \par Will discuss possible NSAIDS with bariatric surgeon. \par restart PT but reports cannot afford  .  \par \par UDS 5/2022 reviewed and c/w prescribed meds repeated today Apr 04, 2023 \par I-Stop reviewed,  reference #: [id number]\par No signs of aberrant behavior and will continue to monitor for signs of toxicity.\par Reminded to continue to avoid alcohol.\par Patient denies other prescribers. \par Discussed OD prevention education and prescribed naloxone kit \par Safe storage of medication was reviewed. \par Opiate agreement was renewed Apr 04, 2023 \par \par \par \par \par \par \par

## 2023-05-09 RX ORDER — RIMEGEPANT SULFATE 75 MG/75MG
TABLET, ORALLY DISINTEGRATING ORAL DAILY
Qty: 8 | Refills: 5 | Status: ACTIVE | COMMUNITY
Start: 2020-09-15 | End: 1900-01-01

## 2023-05-09 RX ORDER — CYCLOBENZAPRINE HYDROCHLORIDE 5 MG/1
5 TABLET, FILM COATED ORAL
Qty: 90 | Refills: 1 | Status: ACTIVE | COMMUNITY
Start: 2022-08-30 | End: 1900-01-01

## 2023-05-31 ENCOUNTER — APPOINTMENT (OUTPATIENT)
Dept: PAIN MANAGEMENT | Facility: CLINIC | Age: 53
End: 2023-05-31
Payer: COMMERCIAL

## 2023-05-31 VITALS
HEIGHT: 64 IN | DIASTOLIC BLOOD PRESSURE: 80 MMHG | HEART RATE: 70 BPM | BODY MASS INDEX: 29.37 KG/M2 | SYSTOLIC BLOOD PRESSURE: 147 MMHG | WEIGHT: 172 LBS

## 2023-05-31 PROCEDURE — 20552 NJX 1/MLT TRIGGER POINT 1/2: CPT

## 2023-05-31 PROCEDURE — 99214 OFFICE O/P EST MOD 30 MIN: CPT | Mod: 25

## 2023-05-31 NOTE — HISTORY OF PRESENT ILLNESS
[FreeTextEntry1] : 54 y/o F with Pmhx Gastric bypass( 125 lbs weight loss), Migraines and chronic lower back pain who is follow up. Today she reports no new medical issues.  She continues to reports chronic constant pain in lower back 4/10 on average but up to 7/10 , midline that sometimes radiates to buttock and thighs, increased with activity and also worse in the cold weather. \par \par  Her headaches have improved and is now having 2-3 per month on average for which she is taking Nurtec prn which helps, increased Nortriptyline 50mg qhs without AE.  Patient reports no new medical issues, approx 120 lbs weight loss following bariatric surgery. \par \par Refuses LESI as previously developed epidural abscess following epidural during labor.  \par \par Prior meds: D/c Flexeril (GI) , MSER 15 mg daily\par Current meds:  Oxycodone 5 mg 2x/day, pregabalin 150 mg TID ,  nortriptyline 50 mg( pain and sleep), Cyclobenzaprine  , Nurtec prn \par \par \par

## 2023-05-31 NOTE — PROCEDURE
[FreeTextEntry1] : The procedure risks, hazards and alternatives were discussed with the patient and appropriate consent was obtained. The area over the myofascial spasm / pain was prepped with alcohol utilizing sterile technique. After isolating it between two palpating fingertips a 27 gauge 1.5” needle was placed in the center of the myofascial spasm and a negative aspiration was performed. A total of 6 mL of 1% Lidocaine mixed with Kenalog 40 mg was injected into 2 sites. The patient tolerated procedure well without any apparent difficulties or complications. \par \par BENITO ANDERSON was monitored in the office for 5 minutes after injections and tolerated procedure well without adverse events.\par

## 2023-05-31 NOTE — ASSESSMENT
[Opioids] : Patient was explained in detail about pain control by using opioids. Patient has signed and fully understands our guidelines for medication and drug screening.  Patient understands the side effects of opioids, including, but not limited to, drug tolerance, dependence, potential for addiction. This class of drugs is habit-forming and [facility] regulated. The sedative effects of opioids can be potentiated by taking alcohol or any sleeping pills, along with opioids. The decision to drive is patient’s responsibility, as opioids can affect his/her driving ability and ability to concentrate. The long-term place is not clear, however, patient understands that once the pain control optimizes, the goal will be to wean off the opioids. All the issues regarding opioid treatment have been addressed satisfactorily.  [FreeTextEntry1] : 52 y/o F with Pmhx Migraines and chronic lower back pain \par \par Discussed in detail the nature of chronic pain as well as chronic opioid use for non-malignant pain. Continued use of opioid are not recommended and can potentially lead to hyperalgesia, tolerance and addiction. Additionally, we extensively discussed the risks possible AE when taking opioids including respiratory suppression and death. We have discussed the importance of exploring nonopioid pain management including physical therapy, massage, acupuncture, TPI. \par \par TPI performed today without AE. \par -Oxycodone 5 mg 2-3x/day will be decreased to 2x/day. \par - continue pregabalin 150 mg TID \par -nortriptyline 50mg q hs ( pain and sleep). \par - cyclobenzaprine prn \par Will discuss possible NSAIDS with bariatric surgeon. \par restart PT but reports cannot afford  .  \par \par UDS 5/2022 reviewed and c/w prescribed meds repeated today Apr 04, 2023 \par I-Stop reviewed,  reference #: [id number]\par No signs of aberrant behavior and will continue to monitor for signs of toxicity.\par Reminded to continue to avoid alcohol.\par Patient denies other prescribers. \par Discussed OD prevention education and prescribed naloxone kit \par Safe storage of medication was reviewed. \par Opiate agreement was renewed Apr 04, 2023 \par \par \par \par \par \par \par

## 2023-07-11 RX ORDER — PREGABALIN 150 MG/1
150 CAPSULE ORAL 3 TIMES DAILY
Qty: 90 | Refills: 1 | Status: ACTIVE | COMMUNITY
Start: 2020-07-21 | End: 1900-01-01

## 2023-07-11 RX ORDER — NORTRIPTYLINE HYDROCHLORIDE 25 MG/1
25 CAPSULE ORAL
Qty: 60 | Refills: 3 | Status: ACTIVE | COMMUNITY
Start: 2022-10-07 | End: 1900-01-01

## 2023-07-11 RX ORDER — OXYCODONE AND ACETAMINOPHEN 5; 325 MG/1; MG/1
5-325 TABLET ORAL
Qty: 60 | Refills: 0 | Status: ACTIVE | COMMUNITY
Start: 2022-10-27 | End: 1900-01-01

## 2025-01-01 ENCOUNTER — NON-APPOINTMENT (OUTPATIENT)
Age: 55
End: 2025-01-01

## 2025-01-21 ENCOUNTER — NON-APPOINTMENT (OUTPATIENT)
Age: 55
End: 2025-01-21